# Patient Record
Sex: FEMALE | Race: WHITE | NOT HISPANIC OR LATINO | Employment: UNEMPLOYED | URBAN - METROPOLITAN AREA
[De-identification: names, ages, dates, MRNs, and addresses within clinical notes are randomized per-mention and may not be internally consistent; named-entity substitution may affect disease eponyms.]

---

## 2016-09-01 LAB — EXTERNAL THINPREP: NORMAL

## 2017-03-09 ENCOUNTER — CLINICAL SUPPORT (OUTPATIENT)
Dept: OBSTETRICS AND GYNECOLOGY | Facility: CLINIC | Age: 22
End: 2017-03-09

## 2017-03-09 VITALS — HEIGHT: 66 IN

## 2017-03-09 DIAGNOSIS — Z30.013 ENCOUNTER FOR INITIAL PRESCRIPTION OF INJECTABLE CONTRACEPTIVE: Primary | ICD-10-CM

## 2017-03-09 PROCEDURE — 96372 THER/PROPH/DIAG INJ SC/IM: CPT | Performed by: OBSTETRICS & GYNECOLOGY

## 2017-03-09 RX ORDER — MEDROXYPROGESTERONE ACETATE 150 MG/ML
150 INJECTION, SUSPENSION INTRAMUSCULAR ONCE
Status: COMPLETED | OUTPATIENT
Start: 2017-03-09 | End: 2017-03-09

## 2017-03-09 RX ADMIN — MEDROXYPROGESTERONE ACETATE 150 MG: 150 INJECTION, SUSPENSION INTRAMUSCULAR at 14:01

## 2017-03-09 NOTE — PROGRESS NOTES
Patient presented today for depo provera injection.    She tolerated the procedure well.  It was given in left deltoid.  Also explained new procedure and will call in rx and patient to  and bring to office/mbc

## 2017-07-27 ENCOUNTER — INITIAL PRENATAL (OUTPATIENT)
Dept: OBSTETRICS AND GYNECOLOGY | Facility: CLINIC | Age: 22
End: 2017-07-27

## 2017-07-27 ENCOUNTER — TELEPHONE (OUTPATIENT)
Dept: OBSTETRICS AND GYNECOLOGY | Facility: CLINIC | Age: 22
End: 2017-07-27

## 2017-07-27 VITALS — DIASTOLIC BLOOD PRESSURE: 74 MMHG | SYSTOLIC BLOOD PRESSURE: 113 MMHG | WEIGHT: 133 LBS | BODY MASS INDEX: 21.47 KG/M2

## 2017-07-27 DIAGNOSIS — O21.9 NAUSEA/VOMITING IN PREGNANCY: ICD-10-CM

## 2017-07-27 DIAGNOSIS — Z02.83 ENCOUNTER FOR DRUG SCREENING: ICD-10-CM

## 2017-07-27 DIAGNOSIS — Z11.3 SCREEN FOR STD (SEXUALLY TRANSMITTED DISEASE): ICD-10-CM

## 2017-07-27 DIAGNOSIS — Z11.4 SCREENING FOR HIV (HUMAN IMMUNODEFICIENCY VIRUS): ICD-10-CM

## 2017-07-27 DIAGNOSIS — N92.6 MISSED MENSES: Primary | ICD-10-CM

## 2017-07-27 DIAGNOSIS — Z34.82 NORMAL PREGNANCY IN MULTIGRAVIDA IN SECOND TRIMESTER: ICD-10-CM

## 2017-07-27 LAB
B-HCG UR QL: POSITIVE
EXTERNAL GC/CHLAMYDIA: NORMAL
EXTERNAL THINPREP: NORMAL
EXTERNAL URINE CULTURE: NORMAL
INTERNAL NEGATIVE CONTROL: NEGATIVE
INTERNAL POSITIVE CONTROL: POSITIVE
Lab: ABNORMAL

## 2017-07-27 PROCEDURE — 99214 OFFICE O/P EST MOD 30 MIN: CPT | Performed by: OBSTETRICS & GYNECOLOGY

## 2017-07-27 PROCEDURE — 81025 URINE PREGNANCY TEST: CPT | Performed by: OBSTETRICS & GYNECOLOGY

## 2017-07-27 RX ORDER — PROMETHAZINE HYDROCHLORIDE 25 MG/1
25 TABLET ORAL EVERY 6 HOURS PRN
Qty: 30 TABLET | Refills: 1 | Status: SHIPPED | OUTPATIENT
Start: 2017-07-27 | End: 2017-11-09

## 2017-07-27 RX ORDER — PNV NO.95/FERROUS FUM/FOLIC AC 28MG-0.8MG
1 TABLET ORAL DAILY
Qty: 30 TABLET | Refills: 11 | Status: SHIPPED | OUTPATIENT
Start: 2017-07-27 | End: 2017-12-07 | Stop reason: SDUPTHER

## 2017-07-27 NOTE — PROGRESS NOTES
Chief complaint: Pregnancy, nausea  History present illness: Patient is here for her initial prenatal visit.  Her last menses was around 2017; however, the patient had been using Depo-Provera.  She had missed her Depo-Provera injection around that time.  She reports that she has not had a period since then.  She feels that she has noticed fetal movement.  She also reports nausea.  She has had problems with nausea and each of her previous 2 pregnancies, but this responded well to promethazine.  She requests prescription for promethazine today.  She denies vaginal bleeding or leakage of fluid.  No pelvic pain.    OB History    Para Term  AB SAB TAB Ectopic Multiple Living   3 2 2       2      # Outcome Date GA Lbr Mohinder/2nd Weight Sex Delivery Anes PTL Lv   3 Current            2 Term      Vag-Spont      1 Term      Vag-Spont           Past Medical History:   Diagnosis Date   • Abnormal Pap smear of cervix    • Urogenital trichomoniasis      History reviewed. No pertinent surgical history.     Family History   Problem Relation Age of Onset   • Alcohol abuse Mother      Social History   Substance Use Topics   • Smoking status: Former Smoker     Packs/day: 0.30     Types: Cigarettes   • Smokeless tobacco: Never Used   • Alcohol use Yes      Comment: occasiional     Meds:  None    No Known Allergies     ROS:  General: No fever or chills  Constitutional: No weight loss or gain, no hair loss  HENT: No headache, no hearing loss, no tinnitus  Eyes: normal vision, no eye pain  Lungs: No cough, no shortness of breath  Heart: No chest pain, no palpitations  Abdomen: Pos nausea, vomiting, No constipation or diarrhea  : No dysuria, no hematuria  Skin: No rashes  Lymph: No swelling  Neuro: No parathesia, no weakness  Psych: Normal though content, no hallucinations, no SI/HI    PE:   Vitals:    17 1105   BP: 113/74   Weight: 133 lb (60.3 kg)   See prenatal physical in flowsheet    Limited  transabdominal ultrasound today to confirm viability: There is a single live intrauterine pregnancy, measuring about 7 weeks and 1 day crown-rump length.  There is visible cardiac activity noted, but unable to obtain the rate by Doppler.    Assessment:  1.  21-year-old  3 para 2 at 7 and one sevenths weeks gestational age by today's ultrasound  2.  History of recent marijuana usage, none since she learned that she was pregnant  3.  Occasional tobacco usage  4.  Nausea and vomiting of pregnancy    Plan:  1.  We discussed revised due date with the patient.  Initial prenatal counseling performed.  Start a daily prenatal vitamin.  Pap smear is up-to-date from last year.  Gonorrhea and chlamydia test today.  Routine prenatal labs today.  We will have the patient perform a formal ultrasound for dating.  Return to the office in 4 weeks for OB follow-up.  2.  We discussed the usage of marijuana in pregnancy.  The risks of marijuana smoking were discussed with the patient.  Total cessation is advised.  Patient does not really smoke, but she reports that she may take up off of the cigarette every now and then.  She is advised to completely avoid tobacco usage as well as secondhand smoke.  She verbalized understanding.  3.  Dietary and lifestyle modifications to help with nausea and vomiting were discussed.  We'll start promethazine.  Patient advised to minimize usage of any medications unless absolute necessary.  I spent 15 out of 25 minutes with the patient in face to face counseling of the above issues.

## 2017-07-27 NOTE — TELEPHONE ENCOUNTER
----- Message from Marleni Culver sent at 7/27/2017 12:30 PM EDT -----  Contact: ob pt  Pt called stating she was seen today in office and asked if she could have a RX for nausea and vomiting. Pt went to the pharmacy and only her prenatals have been sent over. Please advise.    Pt # is [phone]  Pharmacy is in chart

## 2017-07-27 NOTE — TELEPHONE ENCOUNTER
It does not look like this patient made any future appointments.  Please call the patient to schedule her OB follow-up appointment in 4 weeks and an ultrasound appointment for dating next available.

## 2017-07-28 LAB — HCV AB S/CO SERPL IA: 0.1 S/CO RATIO (ref 0–0.9)

## 2017-07-29 LAB
ABO GROUP BLD: (no result)
BACTERIA UR CULT: ABNORMAL
BACTERIA UR CULT: ABNORMAL
BASOPHILS # BLD AUTO: 0 X10E3/UL (ref 0–0.2)
BASOPHILS NFR BLD AUTO: 0 %
BLD GP AB SCN SERPL QL: NEGATIVE
EOSINOPHIL # BLD AUTO: 0 X10E3/UL (ref 0–0.4)
EOSINOPHIL NFR BLD AUTO: 0 %
ERYTHROCYTE [DISTWIDTH] IN BLOOD BY AUTOMATED COUNT: 14 % (ref 12.3–15.4)
HBV SURFACE AG SERPL QL IA: NEGATIVE
HCT VFR BLD AUTO: 37 % (ref 34–46.6)
HGB BLD-MCNC: 12.2 G/DL (ref 11.1–15.9)
HIV 1+2 AB+HIV1 P24 AG SERPL QL IA: NON REACTIVE
IMM GRANULOCYTES # BLD: 0 X10E3/UL (ref 0–0.1)
IMM GRANULOCYTES NFR BLD: 0 %
LYMPHOCYTES # BLD AUTO: 1.4 X10E3/UL (ref 0.7–3.1)
LYMPHOCYTES NFR BLD AUTO: 19 %
MCH RBC QN AUTO: 30.2 PG (ref 26.6–33)
MCHC RBC AUTO-ENTMCNC: 33 G/DL (ref 31.5–35.7)
MCV RBC AUTO: 92 FL (ref 79–97)
MONOCYTES # BLD AUTO: 0.4 X10E3/UL (ref 0.1–0.9)
MONOCYTES NFR BLD AUTO: 5 %
NEUTROPHILS # BLD AUTO: 5.7 X10E3/UL (ref 1.4–7)
NEUTROPHILS NFR BLD AUTO: 76 %
PLATELET # BLD AUTO: 245 X10E3/UL (ref 150–379)
RBC # BLD AUTO: 4.04 X10E6/UL (ref 3.77–5.28)
RH BLD: POSITIVE
RPR SER QL: NON REACTIVE
RUBV IGG SERPL IA-ACNC: 1.15 INDEX
WBC # BLD AUTO: 7.6 X10E3/UL (ref 3.4–10.8)

## 2017-07-30 LAB
C TRACH RRNA SPEC QL NAA+PROBE: NEGATIVE
N GONORRHOEA RRNA SPEC QL NAA+PROBE: NEGATIVE
T VAGINALIS RRNA SPEC QL NAA+PROBE: NEGATIVE

## 2017-07-31 ENCOUNTER — PROCEDURE VISIT (OUTPATIENT)
Dept: OBSTETRICS AND GYNECOLOGY | Facility: CLINIC | Age: 22
End: 2017-07-31

## 2017-07-31 DIAGNOSIS — Z36.89 ENCOUNTER TO ESTABLISH GESTATIONAL AGE USING ULTRASOUND: Primary | ICD-10-CM

## 2017-07-31 PROBLEM — O99.820 GBS (GROUP B STREPTOCOCCUS CARRIER), +RV CULTURE, CURRENTLY PREGNANT: Status: ACTIVE | Noted: 2017-07-31

## 2017-07-31 PROCEDURE — 76817 TRANSVAGINAL US OBSTETRIC: CPT | Performed by: OBSTETRICS & GYNECOLOGY

## 2017-08-01 LAB
11OH-THC SPEC-MCNC: 2.2 NG/ML
AMPHETAMINES SERPL QL SCN: NEGATIVE NG/ML
BARBITURATES SERPL QL SCN: NEGATIVE UG/ML
BENZODIAZ SERPL QL SCN: NEGATIVE NG/ML
CANNABIDIOL SERPLBLD CFM-MCNC: NEGATIVE NG/ML
CANNABINOIDS SERPL QL SCN: ABNORMAL NG/ML
CANNABINOIDS SPEC QL CFM: POSITIVE
CANNABINOL: NEGATIVE NG/ML
CARBOXYTHC SPEC-MCNC: 125.7 NG/ML
COCAINE+BZE SERPL QL SCN: NEGATIVE NG/ML
METHADONE SERPL QL SCN: NEGATIVE NG/ML
OPIATES SERPL QL SCN: NEGATIVE NG/ML
OXYCODONE+OXYMORPHONE SERPLBLD QL SCN: NEGATIVE NG/ML
PCP SERPL QL SCN: NEGATIVE NG/ML
PROPOXYPH SERPL QL SCN: NEGATIVE NG/ML
THC SERPLBLD CFM-MCNC: 4.3 NG/ML

## 2017-08-24 ENCOUNTER — ROUTINE PRENATAL (OUTPATIENT)
Dept: OBSTETRICS AND GYNECOLOGY | Facility: CLINIC | Age: 22
End: 2017-08-24

## 2017-08-24 VITALS — WEIGHT: 133 LBS | BODY MASS INDEX: 21.47 KG/M2 | DIASTOLIC BLOOD PRESSURE: 77 MMHG | SYSTOLIC BLOOD PRESSURE: 118 MMHG

## 2017-08-24 DIAGNOSIS — Z34.80 NORMAL PREGNANCY IN MULTIGRAVIDA: Primary | ICD-10-CM

## 2017-08-24 PROBLEM — Z02.83 ENCOUNTER FOR DRUG SCREENING: Status: RESOLVED | Noted: 2017-07-27 | Resolved: 2017-08-24

## 2017-08-24 PROBLEM — Z11.4 SCREENING FOR HIV (HUMAN IMMUNODEFICIENCY VIRUS): Status: RESOLVED | Noted: 2017-07-27 | Resolved: 2017-08-24

## 2017-08-24 PROBLEM — Z11.3 SCREEN FOR STD (SEXUALLY TRANSMITTED DISEASE): Status: RESOLVED | Noted: 2017-07-27 | Resolved: 2017-08-24

## 2017-08-24 PROCEDURE — 99213 OFFICE O/P EST LOW 20 MIN: CPT | Performed by: OBSTETRICS & GYNECOLOGY

## 2017-08-24 NOTE — PROGRESS NOTES
CC: Pregnancy  History present illness: Patient is here for routine prenatal visit.  She still has some mild nausea, but this is improved with the Phenergan.  Otherwise she is doing well.  She denies vaginal bleeding or leakage of fluid.   Objective: See vital signs in flow sheet  Gen.: No acute distress, awake and oriented ×3  Abdomen: Soft, nontender, fetal heart tones 170  Extremities: No lower extremity edema, no Tenderness  Labs: Prenatal labs reviewed and flow sheet updated  Ultrasound: Reviewed, revised due date discussed  Assessment:  1.  21-year-old  3 para 2 at 10-5/7 weeks gestational age  2.  GBS bacteriuria  Plan:  1.  Patient's ultrasound findings were discussed.  Revised due date was discussed.  Lab results discussed.  Significance of GBS bacteria discussed.  Should a low level of bacteria, and thus does not need treatment at this time.  2.  Patient asked about blood testing to check for gender.  We discussed cell free DNA testing for aneuploidy.  Indications, risks, benefits, and limitations were discussed.  The patient verbalizes understanding.  Patient is currently low risk for aneuploidy, and as per ACOG recommendations would not recommend cell free DNA currently.  Patient will consider quad screen at the appropriate gestational age.  3.  Return to the office in 4 weeks for OB follow-up.  4.  Patient states that she does not want to have anymore children.  We discussed the risks of sterilization at such a young age including the risks of regret.  May be more appropriate to consider long-acting reversible contraception.  Educational handouts were given to the patient.  I spent 12 out of 15 minutes with the patient in face to face counseling of the above issues.

## 2017-09-25 ENCOUNTER — TELEPHONE (OUTPATIENT)
Dept: OBSTETRICS AND GYNECOLOGY | Facility: CLINIC | Age: 22
End: 2017-09-25

## 2017-10-12 ENCOUNTER — ROUTINE PRENATAL (OUTPATIENT)
Dept: OBSTETRICS AND GYNECOLOGY | Facility: CLINIC | Age: 22
End: 2017-10-12

## 2017-10-12 VITALS — BODY MASS INDEX: 21.47 KG/M2 | DIASTOLIC BLOOD PRESSURE: 65 MMHG | WEIGHT: 133 LBS | SYSTOLIC BLOOD PRESSURE: 109 MMHG

## 2017-10-12 DIAGNOSIS — Z34.80 NORMAL PREGNANCY IN MULTIGRAVIDA: Primary | ICD-10-CM

## 2017-10-12 LAB — EXTERNAL GENETIC TESTING, MATERNAL BLOOD: NORMAL

## 2017-10-12 PROCEDURE — 99213 OFFICE O/P EST LOW 20 MIN: CPT | Performed by: OBSTETRICS & GYNECOLOGY

## 2017-10-12 NOTE — PROGRESS NOTES
Chief complaint: Pregnancy  History present illness: Patient here for routine prenatal visit.  She has no complaints today.  She reports that her nausea and vomiting has resolved.  She is no longer taking Phenergan.  She has started to notice some fetal movement recently.  No vaginal bleeding or leakage of fluid.  Objective: See vital signs in flowsheet  Gen.: No acute distress, awake and oriented ×3  Abdomen: Soft, nontender, fetal heart tones 150, fundal height 18 cm  Extremities: No lower extremity edema, tenderness  Assessment:  1.  22-year-old  3 para 2 at 17-5/7 weeks gestational age  Plan:  1.  We discussed screening options for aneuploidy and open neural tube defect.  The risks, benefits, alternatives were discussed.  Patient declines serum screening.  Plan ultrasound in 1 week for anatomy.  2.  We discussed the patient's due date.  Explained revision her due date based on early first trimester ultrasound.  She verbalized understanding.  Ultrasound 1 week.  Return to the office in 4 weeks for OB follow-up.

## 2017-10-17 ENCOUNTER — PROCEDURE VISIT (OUTPATIENT)
Dept: OBSTETRICS AND GYNECOLOGY | Facility: CLINIC | Age: 22
End: 2017-10-17

## 2017-10-17 DIAGNOSIS — Z36.3 ANTENATAL SCREENING FOR MALFORMATION USING ULTRASONICS: Primary | ICD-10-CM

## 2017-10-17 PROCEDURE — 76805 OB US >/= 14 WKS SNGL FETUS: CPT | Performed by: OBSTETRICS & GYNECOLOGY

## 2017-11-03 ENCOUNTER — PROCEDURE VISIT (OUTPATIENT)
Dept: OBSTETRICS AND GYNECOLOGY | Facility: CLINIC | Age: 22
End: 2017-11-03

## 2017-11-03 DIAGNOSIS — IMO0002 EVALUATE ANATOMY NOT SEEN ON PRIOR SONOGRAM: Primary | ICD-10-CM

## 2017-11-03 PROCEDURE — 76816 OB US FOLLOW-UP PER FETUS: CPT | Performed by: OBSTETRICS & GYNECOLOGY

## 2017-11-09 ENCOUNTER — ROUTINE PRENATAL (OUTPATIENT)
Dept: OBSTETRICS AND GYNECOLOGY | Facility: CLINIC | Age: 22
End: 2017-11-09

## 2017-11-09 VITALS — BODY MASS INDEX: 22.27 KG/M2 | DIASTOLIC BLOOD PRESSURE: 64 MMHG | WEIGHT: 138 LBS | SYSTOLIC BLOOD PRESSURE: 117 MMHG

## 2017-11-09 DIAGNOSIS — Z34.80 NORMAL PREGNANCY IN MULTIGRAVIDA: Primary | ICD-10-CM

## 2017-11-09 DIAGNOSIS — O43.192 MARGINAL INSERTION OF UMBILICAL CORD AFFECTING MANAGEMENT OF MOTHER IN SECOND TRIMESTER: ICD-10-CM

## 2017-11-09 DIAGNOSIS — Z13.1 SCREENING FOR DIABETES MELLITUS: ICD-10-CM

## 2017-11-09 PROCEDURE — 99213 OFFICE O/P EST LOW 20 MIN: CPT | Performed by: OBSTETRICS & GYNECOLOGY

## 2017-11-09 NOTE — PROGRESS NOTES
Chief complaint: Pregnancy  History present illness: Patient is here for her routine prenatal visit.  She has no major complaints today.  She reports good fetal movement.  No vaginal bleeding or leakage of fluid.  No contractions.  Objective: See vital signs in flowsheet  Gen.: No acute distress, awake and oriented ×3  Abdomen: Soft, nontender, fundal height 20 cm, fetal heart tones 135  Extremities: No edema, no tenderness  Ultrasound: Ultrasound on 11/3/17 which was performed for interval evaluation of structures not previously seen.  Cardiac anatomy was normal.  There was what appeared to be a marginal insertion of the umbilical cord and the placenta.  Otherwise appropriate interval growth.  Otherwise normal anatomy.  3 vessel cord was noted.  Assessment:  1.  22-year-old  3 para 2 at 21-5/7 weeks gestational age  2.  Marginal insertion of the umbilical cord  Plan:  1.  Ultrasound findings were discussed with the patient.  Limitations of ultrasound were discussed.  The significance of the finding of a marginal cord insertion was discussed.  We discussed the potential for fetal growth restriction, subtle abruption,  delivery, hypertensive disorders of pregnancy, and the potential for stillbirth.  I explained that these more applied to the situation of a velamentous cord insertion, which did not appear to be the case here.  I will refer the patient to maternal fetal medicine for evaluation and recommendations of these findings.  I would suspect she will need to have serial growth scans about every 4 weeks starting at 28 weeks.  The patient verbalizes understanding.  2.  I will plan to see the patient back in 4 weeks for routine follow-up.  She'll need her glucose screen at the next visit.  I spent 12 out of 15 minutes with the patient in face to face counseling of the above issues.

## 2017-12-07 ENCOUNTER — ROUTINE PRENATAL (OUTPATIENT)
Dept: OBSTETRICS AND GYNECOLOGY | Facility: CLINIC | Age: 22
End: 2017-12-07

## 2017-12-07 ENCOUNTER — RESULTS ENCOUNTER (OUTPATIENT)
Dept: OBSTETRICS AND GYNECOLOGY | Facility: CLINIC | Age: 22
End: 2017-12-07

## 2017-12-07 VITALS — DIASTOLIC BLOOD PRESSURE: 58 MMHG | SYSTOLIC BLOOD PRESSURE: 101 MMHG | WEIGHT: 144.6 LBS | BODY MASS INDEX: 23.34 KG/M2

## 2017-12-07 DIAGNOSIS — O43.192 MARGINAL INSERTION OF UMBILICAL CORD AFFECTING MANAGEMENT OF MOTHER IN SECOND TRIMESTER: ICD-10-CM

## 2017-12-07 DIAGNOSIS — Z34.82 NORMAL PREGNANCY IN MULTIGRAVIDA IN SECOND TRIMESTER: ICD-10-CM

## 2017-12-07 DIAGNOSIS — Z34.80 NORMAL PREGNANCY IN MULTIGRAVIDA: Primary | ICD-10-CM

## 2017-12-07 DIAGNOSIS — Z34.80 NORMAL PREGNANCY IN MULTIGRAVIDA: ICD-10-CM

## 2017-12-07 DIAGNOSIS — Z13.1 SCREENING FOR DIABETES MELLITUS: ICD-10-CM

## 2017-12-07 PROCEDURE — 99213 OFFICE O/P EST LOW 20 MIN: CPT | Performed by: OBSTETRICS & GYNECOLOGY

## 2017-12-07 RX ORDER — PNV NO.95/FERROUS FUM/FOLIC AC 28MG-0.8MG
1 TABLET ORAL DAILY
Qty: 30 TABLET | Refills: 11 | Status: SHIPPED | OUTPATIENT
Start: 2017-12-07 | End: 2017-12-28 | Stop reason: SDUPTHER

## 2017-12-07 NOTE — PROGRESS NOTES
Chief complaint pregnancy, needs refills of prenatal vitamins  History of present illness: Patient is here for her routine prenatal visit.  She has no major complaints today.  She'll like refills of her prenatal vitamins.  She reports good fetal movement.  No contractions or vaginal bleeding.  The patient saw maternal-fetal medicine last week.  They recommended serial growth ultrasound and  testing beginning at 32 weeks.  We can do that in this office.  Objective: See vital signs in flowsheet  Gen.: No acute distress, awake and oriented ×3  Abdomen: Soft, nontender, fetal heart tones 155  Extremities: No edema, no tenderness  Assessment:  1.  22-year-old  3 para 2 at 25-5/7 weeks gestational age  2.  Marginal insertion of the umbilical cord  Plan:  1.  Glucose screen today.  2.  I've reviewed general fetal medicine recommendations from her last visit.  They recommended serial growth ultrasound in the third trimester and  testing unit at 32 weeks.  We will plan ultrasound in 3 weeks for growth.  Return to the office in 3 weeks to see me.  Refill of prenatal vitamins.  All questions answered.  I spent 12 out of 15 minutes with the patient in face to face counseling of the above issues.

## 2017-12-08 LAB — GLUCOSE 1H P 50 G GLC PO SERPL-MCNC: 76 MG/DL (ref 65–139)

## 2017-12-28 ENCOUNTER — ROUTINE PRENATAL (OUTPATIENT)
Dept: OBSTETRICS AND GYNECOLOGY | Facility: CLINIC | Age: 22
End: 2017-12-28

## 2017-12-28 ENCOUNTER — PROCEDURE VISIT (OUTPATIENT)
Dept: OBSTETRICS AND GYNECOLOGY | Facility: CLINIC | Age: 22
End: 2017-12-28

## 2017-12-28 VITALS — SYSTOLIC BLOOD PRESSURE: 136 MMHG | WEIGHT: 142 LBS | DIASTOLIC BLOOD PRESSURE: 82 MMHG | BODY MASS INDEX: 22.92 KG/M2

## 2017-12-28 DIAGNOSIS — Z34.80 SUPERVISION OF OTHER NORMAL PREGNANCY, ANTEPARTUM: Primary | ICD-10-CM

## 2017-12-28 DIAGNOSIS — Z36.89 ENCOUNTER FOR ULTRASOUND TO CHECK FETAL GROWTH: Primary | ICD-10-CM

## 2017-12-28 DIAGNOSIS — O43.199 MARGINAL INSERTION OF UMBILICAL CORD AFFECTING MANAGEMENT OF MOTHER: ICD-10-CM

## 2017-12-28 DIAGNOSIS — Z34.82 NORMAL PREGNANCY IN MULTIGRAVIDA IN SECOND TRIMESTER: ICD-10-CM

## 2017-12-28 PROCEDURE — 90471 IMMUNIZATION ADMIN: CPT | Performed by: OBSTETRICS & GYNECOLOGY

## 2017-12-28 PROCEDURE — 99213 OFFICE O/P EST LOW 20 MIN: CPT | Performed by: OBSTETRICS & GYNECOLOGY

## 2017-12-28 PROCEDURE — 76816 OB US FOLLOW-UP PER FETUS: CPT | Performed by: OBSTETRICS & GYNECOLOGY

## 2017-12-28 PROCEDURE — 90656 IIV3 VACC NO PRSV 0.5 ML IM: CPT | Performed by: OBSTETRICS & GYNECOLOGY

## 2017-12-28 RX ORDER — PNV NO.95/FERROUS FUM/FOLIC AC 28MG-0.8MG
1 TABLET ORAL DAILY
Qty: 30 TABLET | Refills: 11 | Status: SHIPPED | OUTPATIENT
Start: 2017-12-28 | End: 2018-05-11

## 2017-12-28 NOTE — PROGRESS NOTES
Chief Complaint   Patient presents with   • Routine Prenatal Visit      Margaret Olmstead is a 22 y.o.  at 28w5d   Reports some round ligament pain and lower back pain  Denies vaginal bleeding, LOF, contractions  Notes normal fetal movements  /82  Wt 64.4 kg (142 lb)  LMP 2017 (Exact Date)  BMI 22.92 kg/m2   Abd: gravid, nontender  See flowsheet  ASSESSMENT:   IUP at 28w5d   PLAN:  Growth ultrasound reviewed today - 2lb 13oz (57.1%), BREECH, marginal cord insertion  Flu vaccine today, deferred Tdap until 2 weeks  Discussed contraception. Patient interested in LARC or permanent sterilization.  Will consider options and discuss further at next visit  RTO 2 weeks  I spent at least 10 minutes of 15 minute visit in face-to-face counseling

## 2018-01-11 ENCOUNTER — TELEPHONE (OUTPATIENT)
Dept: OBSTETRICS AND GYNECOLOGY | Facility: CLINIC | Age: 23
End: 2018-01-11

## 2018-01-11 ENCOUNTER — ROUTINE PRENATAL (OUTPATIENT)
Dept: OBSTETRICS AND GYNECOLOGY | Facility: CLINIC | Age: 23
End: 2018-01-11

## 2018-01-11 VITALS — SYSTOLIC BLOOD PRESSURE: 115 MMHG | BODY MASS INDEX: 22.92 KG/M2 | DIASTOLIC BLOOD PRESSURE: 76 MMHG | WEIGHT: 142 LBS

## 2018-01-11 DIAGNOSIS — O43.193 MARGINAL INSERTION OF UMBILICAL CORD AFFECTING MANAGEMENT OF MOTHER IN THIRD TRIMESTER: Primary | ICD-10-CM

## 2018-01-11 DIAGNOSIS — O26.893 HEARTBURN DURING PREGNANCY IN THIRD TRIMESTER: Primary | ICD-10-CM

## 2018-01-11 DIAGNOSIS — R12 HEARTBURN DURING PREGNANCY IN THIRD TRIMESTER: Primary | ICD-10-CM

## 2018-01-11 PROBLEM — Z13.1 SCREENING FOR DIABETES MELLITUS: Status: RESOLVED | Noted: 2017-11-09 | Resolved: 2018-01-11

## 2018-01-11 PROBLEM — Z23 NEED FOR DTAP VACCINATION: Status: ACTIVE | Noted: 2018-01-11

## 2018-01-11 PROCEDURE — 99213 OFFICE O/P EST LOW 20 MIN: CPT | Performed by: OBSTETRICS & GYNECOLOGY

## 2018-01-11 RX ORDER — NICOTINE POLACRILEX 4 MG/1
20 GUM, CHEWING ORAL DAILY
Qty: 30 EACH | Refills: 5 | Status: SHIPPED | OUTPATIENT
Start: 2018-01-11 | End: 2018-03-05 | Stop reason: HOSPADM

## 2018-01-11 NOTE — TELEPHONE ENCOUNTER
----- Message from Marleni Culver sent at 1/11/2018  2:23 PM EST -----  Ob pt called stating she talked to you about getting a RX for heartburn. She called the pharmacy and they are telling her nothing was called in. Please advise.    PT # -[phone] (M)  Pharmacy is in chart

## 2018-01-11 NOTE — PROGRESS NOTES
Chief complaint: Pregnancy  History present illness: Patient is here for her routine visit.  She has no major complaints today.  She reports good fetal movement.  Objective: See vital signs in flowsheet  Gen.: No acute distress, awake and oriented ×3  Abdomen: Soft, nontender, fetal heart tones 145  Extremities: No edema, no tenderness  Assessment:  1.  22-year-old  3 para 2 at 30-5/7 weeks gestational age  2.  Marginal insertion of umbilical cord  3.  Needs Tdap  Plan:  1.  We will repeat ultrasound in 2 weeks for growth secondary to marginal insertion of cord.  She will also need to start doing weekly ultrasounds for biophysical profile at that time.  2.  Patient had her flu shot last visit.  She will have the Tdap today.  3.  We discussed postpartum contraceptive plans.  Patient is interested in Nexplanon.  I spent 12 out of 15 minutes with the patient in face to face counseling of the above issues.

## 2018-01-17 ENCOUNTER — TELEPHONE (OUTPATIENT)
Dept: OBSTETRICS AND GYNECOLOGY | Facility: CLINIC | Age: 23
End: 2018-01-17

## 2018-01-17 NOTE — TELEPHONE ENCOUNTER
"Heena.    This patient called last night with \"severe\" pain.  I instructed her to go to hospital and she never showed.  Can you call her and find out how she is doing today?    Thanks    Kaitlin"

## 2018-01-25 ENCOUNTER — ROUTINE PRENATAL (OUTPATIENT)
Dept: OBSTETRICS AND GYNECOLOGY | Facility: CLINIC | Age: 23
End: 2018-01-25

## 2018-01-25 ENCOUNTER — PROCEDURE VISIT (OUTPATIENT)
Dept: OBSTETRICS AND GYNECOLOGY | Facility: CLINIC | Age: 23
End: 2018-01-25

## 2018-01-25 VITALS — DIASTOLIC BLOOD PRESSURE: 69 MMHG | BODY MASS INDEX: 23.6 KG/M2 | SYSTOLIC BLOOD PRESSURE: 105 MMHG | WEIGHT: 146.2 LBS

## 2018-01-25 DIAGNOSIS — Z34.80 NORMAL PREGNANCY IN MULTIGRAVIDA: Primary | ICD-10-CM

## 2018-01-25 DIAGNOSIS — O26.843 UTERINE SIZE DATE DISCREPANCY PREGNANCY, THIRD TRIMESTER: Primary | ICD-10-CM

## 2018-01-25 DIAGNOSIS — O43.193 MARGINAL INSERTION OF UMBILICAL CORD AFFECTING MANAGEMENT OF MOTHER IN THIRD TRIMESTER: ICD-10-CM

## 2018-01-25 PROCEDURE — 76819 FETAL BIOPHYS PROFIL W/O NST: CPT | Performed by: OBSTETRICS & GYNECOLOGY

## 2018-01-25 PROCEDURE — 76816 OB US FOLLOW-UP PER FETUS: CPT | Performed by: OBSTETRICS & GYNECOLOGY

## 2018-01-25 PROCEDURE — 99213 OFFICE O/P EST LOW 20 MIN: CPT | Performed by: OBSTETRICS & GYNECOLOGY

## 2018-01-25 NOTE — PROGRESS NOTES
Chief complaint: Pregnancy, [place] Cid  History present illness: Patient is here for her routine visit.  She has no major complaints today.  Reports good fetal movement.  She reports having some occasional cramping and Jose Miguel Cid contractions, which had previously resolved with rest, position change, and oral hydration.  The patient like to discuss permanent sterilization.  She reports that she does not want to have anymore children in the future.  She is concerned that she is 22 and has already had 3 children.  Objective: See vital signs in flowsheet  Gen.: No acute distress, awake and oriented ×3  Abdomen: Soft, nontender, fetal heart tones 141  Extremities: No edema, no tenderness  Ultrasound: Estimated fetal weight 4 lbs. 3 oz. or the 35th percentile.  Abdominal circumference 27th percentile.  Amniotic fluid index 13 7 m.  Vertex.  Biophysical profile   Assessment:  1.  22-year-old  3 para 2 at 32-5/7 weeks gestational age  2.  Marginal insertion of umbilical cord  3.  GBS bacteriuria  Plan:  1.  Ultrasound findings discussed with the patient.  Vertex.  Normal growth.  Continue weekly biophysical profile secondary to history of marginal insertion of cord.  2.   labor signs discussed with the patient.  We discussed issues with occasional [place] contractions.  Reassurance offered.  3.  We discussed patient's postpartum contraceptive plans.  Given her young age I would strongly encourage the patient not to consider permanent sterilization.  I feel that she would be much happier with long-acting reversible contraception, such as Mirena IUD, ParaGard IUD, Nexplanon.  The risks, benefits, alternatives were discussed.  Patient seems interested in Mirena.  I spent 12 out of 15 minutes with the patient in face to face counseling of the above issues.

## 2018-02-01 ENCOUNTER — PROCEDURE VISIT (OUTPATIENT)
Dept: OBSTETRICS AND GYNECOLOGY | Facility: CLINIC | Age: 23
End: 2018-02-01

## 2018-02-01 DIAGNOSIS — O43.193 MARGINAL INSERTION OF UMBILICAL CORD AFFECTING MANAGEMENT OF MOTHER IN THIRD TRIMESTER: Primary | ICD-10-CM

## 2018-02-01 PROCEDURE — 76819 FETAL BIOPHYS PROFIL W/O NST: CPT | Performed by: OBSTETRICS & GYNECOLOGY

## 2018-02-07 ENCOUNTER — ROUTINE PRENATAL (OUTPATIENT)
Dept: OBSTETRICS AND GYNECOLOGY | Facility: CLINIC | Age: 23
End: 2018-02-07

## 2018-02-07 ENCOUNTER — PROCEDURE VISIT (OUTPATIENT)
Dept: OBSTETRICS AND GYNECOLOGY | Facility: CLINIC | Age: 23
End: 2018-02-07

## 2018-02-07 VITALS — BODY MASS INDEX: 23.79 KG/M2 | SYSTOLIC BLOOD PRESSURE: 135 MMHG | WEIGHT: 147.4 LBS | DIASTOLIC BLOOD PRESSURE: 83 MMHG

## 2018-02-07 DIAGNOSIS — O43.193 MARGINAL INSERTION OF UMBILICAL CORD AFFECTING MANAGEMENT OF MOTHER IN THIRD TRIMESTER: Primary | ICD-10-CM

## 2018-02-07 DIAGNOSIS — Z3A.34 34 WEEKS GESTATION OF PREGNANCY: ICD-10-CM

## 2018-02-07 PROCEDURE — 99213 OFFICE O/P EST LOW 20 MIN: CPT | Performed by: OBSTETRICS & GYNECOLOGY

## 2018-02-07 PROCEDURE — 76819 FETAL BIOPHYS PROFIL W/O NST: CPT | Performed by: OBSTETRICS & GYNECOLOGY

## 2018-02-07 NOTE — PROGRESS NOTES
CC:  Pregnancy  Pt c/o pressure and Jose Miguel Cid contractions.  Reassurance given.   labor precautions reviewed.  Discussed fetal kick counts.  BPP  today.    A/P:  Supervision of pregnancy at 34 weeks with marginal cord insertion  --Continue weekly BPPs  --F/U in 2 weeks with Dr. Mathias

## 2018-02-15 ENCOUNTER — ROUTINE PRENATAL (OUTPATIENT)
Dept: OBSTETRICS AND GYNECOLOGY | Facility: CLINIC | Age: 23
End: 2018-02-15

## 2018-02-15 ENCOUNTER — PROCEDURE VISIT (OUTPATIENT)
Dept: OBSTETRICS AND GYNECOLOGY | Facility: CLINIC | Age: 23
End: 2018-02-15

## 2018-02-15 VITALS — WEIGHT: 148.6 LBS | DIASTOLIC BLOOD PRESSURE: 75 MMHG | BODY MASS INDEX: 23.98 KG/M2 | SYSTOLIC BLOOD PRESSURE: 129 MMHG

## 2018-02-15 DIAGNOSIS — O43.193 MARGINAL INSERTION OF UMBILICAL CORD AFFECTING MANAGEMENT OF MOTHER IN THIRD TRIMESTER: ICD-10-CM

## 2018-02-15 DIAGNOSIS — Z34.80 NORMAL PREGNANCY IN MULTIGRAVIDA: Primary | ICD-10-CM

## 2018-02-15 DIAGNOSIS — O43.199 MARGINAL INSERTION OF UMBILICAL CORD AFFECTING MANAGEMENT OF MOTHER: Primary | ICD-10-CM

## 2018-02-15 DIAGNOSIS — O99.820 GBS (GROUP B STREPTOCOCCUS CARRIER), +RV CULTURE, CURRENTLY PREGNANT: ICD-10-CM

## 2018-02-15 PROBLEM — R12 HEARTBURN DURING PREGNANCY IN THIRD TRIMESTER: Status: RESOLVED | Noted: 2018-01-11 | Resolved: 2018-02-15

## 2018-02-15 PROBLEM — Z23 NEED FOR DTAP VACCINATION: Status: RESOLVED | Noted: 2018-01-11 | Resolved: 2018-02-15

## 2018-02-15 PROBLEM — O21.9 NAUSEA/VOMITING IN PREGNANCY: Status: RESOLVED | Noted: 2017-07-27 | Resolved: 2018-02-15

## 2018-02-15 PROBLEM — O26.893 HEARTBURN DURING PREGNANCY IN THIRD TRIMESTER: Status: RESOLVED | Noted: 2018-01-11 | Resolved: 2018-02-15

## 2018-02-15 PROCEDURE — 99213 OFFICE O/P EST LOW 20 MIN: CPT | Performed by: OBSTETRICS & GYNECOLOGY

## 2018-02-15 PROCEDURE — 76819 FETAL BIOPHYS PROFIL W/O NST: CPT | Performed by: OBSTETRICS & GYNECOLOGY

## 2018-02-15 NOTE — PROGRESS NOTES
Chief complaint: Pregnancy, back pain  History of present illness: Patient here for routine prenatal visit.  She does note some occasional low back pain.  No vaginal bleeding or leakage of fluid.  Normal fetal movement.  Objective: See vital signs in flowsheet  Gen.: No acute distress, awake and oriented ×3  Abdomen: Soft, nontender, fetal heart tones 150  Extremities: No edema, no tenderness  Ultrasound today: Biophysical profile .  Amniotic fluid index 14 cm.  Assessment:  22-year-old  3 para 2 at 35-5/7 weeks gestational age  2.  Marginal insertion of umbilical cord  3.  GBS in urine  Plan:  1.  Ultrasound findings discussed with the patient today.  Continue weekly biophysical profile secondary to marginal insertion.  2.  Labor signs discussed.  3.  Do not need to repeat GBS today, since patient has positive GBS in urine.  We will treat as positive in labor.  Return to the office in 1 week.

## 2018-02-22 ENCOUNTER — PROCEDURE VISIT (OUTPATIENT)
Dept: OBSTETRICS AND GYNECOLOGY | Facility: CLINIC | Age: 23
End: 2018-02-22

## 2018-02-22 ENCOUNTER — ROUTINE PRENATAL (OUTPATIENT)
Dept: OBSTETRICS AND GYNECOLOGY | Facility: CLINIC | Age: 23
End: 2018-02-22

## 2018-02-22 VITALS — BODY MASS INDEX: 24.21 KG/M2 | SYSTOLIC BLOOD PRESSURE: 115 MMHG | WEIGHT: 150 LBS | DIASTOLIC BLOOD PRESSURE: 70 MMHG

## 2018-02-22 DIAGNOSIS — Z34.80 NORMAL PREGNANCY IN MULTIGRAVIDA: Primary | ICD-10-CM

## 2018-02-22 DIAGNOSIS — O43.193 MARGINAL INSERTION OF UMBILICAL CORD AFFECTING MANAGEMENT OF MOTHER IN THIRD TRIMESTER: ICD-10-CM

## 2018-02-22 DIAGNOSIS — O43.193 MARGINAL INSERTION OF UMBILICAL CORD AFFECTING MANAGEMENT OF MOTHER IN THIRD TRIMESTER: Primary | ICD-10-CM

## 2018-02-22 PROCEDURE — 99213 OFFICE O/P EST LOW 20 MIN: CPT | Performed by: OBSTETRICS & GYNECOLOGY

## 2018-02-22 PROCEDURE — 76816 OB US FOLLOW-UP PER FETUS: CPT | Performed by: OBSTETRICS & GYNECOLOGY

## 2018-02-22 PROCEDURE — 76819 FETAL BIOPHYS PROFIL W/O NST: CPT | Performed by: OBSTETRICS & GYNECOLOGY

## 2018-02-22 NOTE — PROGRESS NOTES
Chief complaint: Pregnancy, Jose Miguel Cid contractions  History present illness: Patient is here for her routine prenatal visit.  She does note some tightening pains in her lower abdomen and some pelvic pressure.  So far these are somewhat rare and mild still.  Otherwise she is doing well.  Normal fetal movement.  No vaginal bleeding or leakage of fluid.  Objective: See vital signs in flowsheet  Gen.: No acute distress, awake and oriented ×3  Abdomen: Soft, nontender  External is: No edema, no tenderness  Ultrasound today: Estimated fetal weight 6 lbs. 11 oz. or the 53rd percentile.  Vertex.  Amniotic fluid index normal.  Biophysical profile   Assessment:  1.  22-year-old  3 para 2 at 36-5/7 weeks gestational age  2.  Marginal insertion of umbilical cord   3.  GBS bacteriuria  Plan:  1.  Ultrasound findings discussed with the patient.  Growth continues to be reassuring.  We will continue weekly biophysical profile secondary to marginal insertion of cord.  We discussed plans with the patient for labor induction at 39+ weeks of gestation secondary to marginal insertion of cord.  She verbalized understanding and agrees with the plan.  2.  Return to the office in 1 week.  3.  Labor signs discussed.  I spent 12 out of 15 minutes with the patient in face to face counseling of the above issues.

## 2018-03-01 ENCOUNTER — PROCEDURE VISIT (OUTPATIENT)
Dept: OBSTETRICS AND GYNECOLOGY | Facility: CLINIC | Age: 23
End: 2018-03-01

## 2018-03-01 ENCOUNTER — ROUTINE PRENATAL (OUTPATIENT)
Dept: OBSTETRICS AND GYNECOLOGY | Facility: CLINIC | Age: 23
End: 2018-03-01

## 2018-03-01 VITALS — BODY MASS INDEX: 23.89 KG/M2 | DIASTOLIC BLOOD PRESSURE: 79 MMHG | WEIGHT: 148 LBS | SYSTOLIC BLOOD PRESSURE: 130 MMHG

## 2018-03-01 DIAGNOSIS — O43.193 MARGINAL INSERTION OF UMBILICAL CORD AFFECTING MANAGEMENT OF MOTHER IN THIRD TRIMESTER: ICD-10-CM

## 2018-03-01 DIAGNOSIS — O43.193 MARGINAL INSERTION OF UMBILICAL CORD AFFECTING MANAGEMENT OF MOTHER IN THIRD TRIMESTER: Primary | ICD-10-CM

## 2018-03-01 DIAGNOSIS — Z34.80 NORMAL PREGNANCY IN MULTIGRAVIDA: Primary | ICD-10-CM

## 2018-03-01 DIAGNOSIS — O99.820 GBS (GROUP B STREPTOCOCCUS CARRIER), +RV CULTURE, CURRENTLY PREGNANT: ICD-10-CM

## 2018-03-01 PROCEDURE — 76819 FETAL BIOPHYS PROFIL W/O NST: CPT | Performed by: OBSTETRICS & GYNECOLOGY

## 2018-03-01 PROCEDURE — 99213 OFFICE O/P EST LOW 20 MIN: CPT | Performed by: OBSTETRICS & GYNECOLOGY

## 2018-03-01 NOTE — PROGRESS NOTES
Chief complaint: Pregnancy  History present illness: Patient is here for her routine prenatal visit.  She does report some contractions at home.  Good fetal movement.  Objective: See vital signs in flowsheet  Gen.: No acute distress, awake and oriented ×3  Abdomen: Soft, nontender, fetal heart tones 153  Cervix: 4 cm, 70% effaced, -1  Extremities: No edema, no tenderness  Ultrasound today: Biophysical profile .  Amniotic fluid index normal.  Assessment:  1.  22-year-old  3 para 2 at 37-5/7 weeks gestational age  2.  Marginal insertion of umbilical cord  3.  GBS positive  Plan:  1.  Labor signs discussed with the patient.  Given her current cervical exam findings, I would suspect that she will progress into spontaneous labor on her own cervical however, in the event that she reaches 39 weeks of gestation, I would favor labor induction at 39+ weeks gestation given the finding of a marginal insertion of the cord.  The risks, benefits, and alternatives were discussed.  Instructions were given.  I will see the patient back in 1 week for routine prenatal visit.  Positive profile in 1 week as well.  I spent 12 out of 15 minutes with the patient in face to face counseling of the above issues.

## 2018-03-03 ENCOUNTER — HOSPITAL ENCOUNTER (INPATIENT)
Facility: HOSPITAL | Age: 23
LOS: 2 days | Discharge: HOME | End: 2018-03-05
Attending: OBSTETRICS & GYNECOLOGY | Admitting: OBSTETRICS & GYNECOLOGY

## 2018-03-03 ENCOUNTER — ANESTHESIA EVENT (OUTPATIENT)
Dept: LABOR AND DELIVERY | Facility: HOSPITAL | Age: 23
End: 2018-03-03

## 2018-03-03 ENCOUNTER — ANESTHESIA (OUTPATIENT)
Dept: LABOR AND DELIVERY | Facility: HOSPITAL | Age: 23
End: 2018-03-03

## 2018-03-03 PROBLEM — Z34.90 PREGNANCY: Status: ACTIVE | Noted: 2018-03-03

## 2018-03-03 LAB
ABO GROUP BLD: NORMAL
AMPHET+METHAMPHET UR QL: NEGATIVE
BARBITURATES UR QL SCN: NEGATIVE
BENZODIAZ UR QL SCN: NEGATIVE
BLD GP AB SCN SERPL QL: NEGATIVE
CANNABINOIDS SERPL QL: POSITIVE
COCAINE UR QL: NEGATIVE
DEPRECATED RDW RBC AUTO: 49.1 FL (ref 37–54)
ERYTHROCYTE [DISTWIDTH] IN BLOOD BY AUTOMATED COUNT: 15.1 % (ref 11.7–13)
HCT VFR BLD AUTO: 29.5 % (ref 35.6–45.5)
HGB BLD-MCNC: 9.9 G/DL (ref 11.9–15.5)
MCH RBC QN AUTO: 29.8 PG (ref 26.9–32)
MCHC RBC AUTO-ENTMCNC: 33.6 G/DL (ref 32.4–36.3)
MCV RBC AUTO: 88.9 FL (ref 80.5–98.2)
METHADONE UR QL SCN: NEGATIVE
OPIATES UR QL: NEGATIVE
OXYCODONE UR QL SCN: NEGATIVE
PLATELET # BLD AUTO: 144 10*3/MM3 (ref 140–500)
PMV BLD AUTO: 11.4 FL (ref 6–12)
RBC # BLD AUTO: 3.32 10*6/MM3 (ref 3.9–5.2)
RH BLD: POSITIVE
WBC NRBC COR # BLD: 9.15 10*3/MM3 (ref 4.5–10.7)

## 2018-03-03 PROCEDURE — 3E03329 INTRODUCTION OF OTHER ANTI-INFECTIVE INTO PERIPHERAL VEIN, PERCUTANEOUS APPROACH: ICD-10-PCS | Performed by: OBSTETRICS & GYNECOLOGY

## 2018-03-03 PROCEDURE — 25010000002 ROPIVACAINE PER 1 MG: Performed by: ANESTHESIOLOGY

## 2018-03-03 PROCEDURE — G0480 DRUG TEST DEF 1-7 CLASSES: HCPCS | Performed by: OBSTETRICS & GYNECOLOGY

## 2018-03-03 PROCEDURE — 80307 DRUG TEST PRSMV CHEM ANLYZR: CPT | Performed by: OBSTETRICS & GYNECOLOGY

## 2018-03-03 PROCEDURE — 85027 COMPLETE CBC AUTOMATED: CPT | Performed by: OBSTETRICS & GYNECOLOGY

## 2018-03-03 PROCEDURE — 86850 RBC ANTIBODY SCREEN: CPT | Performed by: OBSTETRICS & GYNECOLOGY

## 2018-03-03 PROCEDURE — 86900 BLOOD TYPING SEROLOGIC ABO: CPT | Performed by: OBSTETRICS & GYNECOLOGY

## 2018-03-03 PROCEDURE — 10907ZC DRAINAGE OF AMNIOTIC FLUID, THERAPEUTIC FROM PRODUCTS OF CONCEPTION, VIA NATURAL OR ARTIFICIAL OPENING: ICD-10-PCS | Performed by: OBSTETRICS & GYNECOLOGY

## 2018-03-03 PROCEDURE — 0HQ9XZZ REPAIR PERINEUM SKIN, EXTERNAL APPROACH: ICD-10-PCS | Performed by: OBSTETRICS & GYNECOLOGY

## 2018-03-03 PROCEDURE — 25010000002 PENICILLIN G POTASSIUM PER 600000 UNITS: Performed by: OBSTETRICS & GYNECOLOGY

## 2018-03-03 PROCEDURE — 86901 BLOOD TYPING SEROLOGIC RH(D): CPT | Performed by: OBSTETRICS & GYNECOLOGY

## 2018-03-03 PROCEDURE — C1755 CATHETER, INTRASPINAL: HCPCS | Performed by: ANESTHESIOLOGY

## 2018-03-03 PROCEDURE — 59410 OBSTETRICAL CARE: CPT | Performed by: OBSTETRICS & GYNECOLOGY

## 2018-03-03 PROCEDURE — 88307 TISSUE EXAM BY PATHOLOGIST: CPT

## 2018-03-03 PROCEDURE — S0260 H&P FOR SURGERY: HCPCS | Performed by: OBSTETRICS & GYNECOLOGY

## 2018-03-03 RX ORDER — SODIUM CHLORIDE 0.9 % (FLUSH) 0.9 %
1-10 SYRINGE (ML) INJECTION AS NEEDED
Status: DISCONTINUED | OUTPATIENT
Start: 2018-03-03 | End: 2018-03-05 | Stop reason: HOSPADM

## 2018-03-03 RX ORDER — TERBUTALINE SULFATE 1 MG/ML
0.25 INJECTION, SOLUTION SUBCUTANEOUS AS NEEDED
Status: DISCONTINUED | OUTPATIENT
Start: 2018-03-03 | End: 2018-03-03 | Stop reason: HOSPADM

## 2018-03-03 RX ORDER — ROPIVACAINE HYDROCHLORIDE 2 MG/ML
INJECTION, SOLUTION EPIDURAL; INFILTRATION; PERINEURAL AS NEEDED
Status: DISCONTINUED | OUTPATIENT
Start: 2018-03-03 | End: 2018-03-03 | Stop reason: SURG

## 2018-03-03 RX ORDER — MISOPROSTOL 200 UG/1
800 TABLET ORAL AS NEEDED
Status: DISCONTINUED | OUTPATIENT
Start: 2018-03-03 | End: 2018-03-05 | Stop reason: HOSPADM

## 2018-03-03 RX ORDER — PENICILLIN G 3000000 [IU]/50ML
3 INJECTION, SOLUTION INTRAVENOUS EVERY 4 HOURS
Status: DISCONTINUED | OUTPATIENT
Start: 2018-03-03 | End: 2018-03-03 | Stop reason: HOSPADM

## 2018-03-03 RX ORDER — DIPHENHYDRAMINE HYDROCHLORIDE 50 MG/ML
12.5 INJECTION INTRAMUSCULAR; INTRAVENOUS EVERY 8 HOURS PRN
Status: DISCONTINUED | OUTPATIENT
Start: 2018-03-03 | End: 2018-03-03 | Stop reason: HOSPADM

## 2018-03-03 RX ORDER — ZOLPIDEM TARTRATE 5 MG/1
5 TABLET ORAL NIGHTLY PRN
Status: DISCONTINUED | OUTPATIENT
Start: 2018-03-03 | End: 2018-03-05 | Stop reason: HOSPADM

## 2018-03-03 RX ORDER — METHYLERGONOVINE MALEATE 0.2 MG/ML
200 INJECTION INTRAVENOUS ONCE AS NEEDED
Status: DISCONTINUED | OUTPATIENT
Start: 2018-03-03 | End: 2018-03-05 | Stop reason: HOSPADM

## 2018-03-03 RX ORDER — HYDROCODONE BITARTRATE AND ACETAMINOPHEN 5; 325 MG/1; MG/1
1 TABLET ORAL EVERY 4 HOURS PRN
Status: DISCONTINUED | OUTPATIENT
Start: 2018-03-03 | End: 2018-03-05 | Stop reason: HOSPADM

## 2018-03-03 RX ORDER — DOCUSATE SODIUM 100 MG/1
100 CAPSULE, LIQUID FILLED ORAL 2 TIMES DAILY
Status: DISCONTINUED | OUTPATIENT
Start: 2018-03-03 | End: 2018-03-05 | Stop reason: HOSPADM

## 2018-03-03 RX ORDER — OXYTOCIN-SODIUM CHLORIDE 0.9% IV SOLN 30 UNIT/500ML 30-0.9/5 UT/ML-%
125 SOLUTION INTRAVENOUS CONTINUOUS PRN
Status: COMPLETED | OUTPATIENT
Start: 2018-03-03 | End: 2018-03-03

## 2018-03-03 RX ORDER — CARBOPROST TROMETHAMINE 250 UG/ML
250 INJECTION, SOLUTION INTRAMUSCULAR AS NEEDED
Status: DISCONTINUED | OUTPATIENT
Start: 2018-03-03 | End: 2018-03-05 | Stop reason: HOSPADM

## 2018-03-03 RX ORDER — IBUPROFEN 600 MG/1
600 TABLET ORAL EVERY 8 HOURS PRN
Status: DISCONTINUED | OUTPATIENT
Start: 2018-03-03 | End: 2018-03-05 | Stop reason: HOSPADM

## 2018-03-03 RX ORDER — ONDANSETRON 2 MG/ML
4 INJECTION INTRAMUSCULAR; INTRAVENOUS ONCE AS NEEDED
Status: DISCONTINUED | OUTPATIENT
Start: 2018-03-03 | End: 2018-03-03 | Stop reason: HOSPADM

## 2018-03-03 RX ORDER — ONDANSETRON 4 MG/1
4 TABLET, FILM COATED ORAL EVERY 8 HOURS PRN
Status: DISCONTINUED | OUTPATIENT
Start: 2018-03-03 | End: 2018-03-05 | Stop reason: HOSPADM

## 2018-03-03 RX ORDER — FAMOTIDINE 10 MG/ML
20 INJECTION, SOLUTION INTRAVENOUS ONCE AS NEEDED
Status: DISCONTINUED | OUTPATIENT
Start: 2018-03-03 | End: 2018-03-03 | Stop reason: HOSPADM

## 2018-03-03 RX ORDER — ERYTHROMYCIN 5 MG/G
OINTMENT OPHTHALMIC
Status: DISPENSED
Start: 2018-03-03 | End: 2018-03-04

## 2018-03-03 RX ORDER — OXYTOCIN-SODIUM CHLORIDE 0.9% IV SOLN 30 UNIT/500ML 30-0.9/5 UT/ML-%
250 SOLUTION INTRAVENOUS CONTINUOUS
Status: DISPENSED | OUTPATIENT
Start: 2018-03-03 | End: 2018-03-03

## 2018-03-03 RX ORDER — LIDOCAINE HYDROCHLORIDE 10 MG/ML
5 INJECTION, SOLUTION EPIDURAL; INFILTRATION; INTRACAUDAL; PERINEURAL AS NEEDED
Status: DISCONTINUED | OUTPATIENT
Start: 2018-03-03 | End: 2018-03-03 | Stop reason: HOSPADM

## 2018-03-03 RX ORDER — PRENATAL VIT NO.126/IRON/FOLIC 28MG-0.8MG
1 TABLET ORAL DAILY
Status: DISCONTINUED | OUTPATIENT
Start: 2018-03-03 | End: 2018-03-05 | Stop reason: HOSPADM

## 2018-03-03 RX ORDER — OXYTOCIN-SODIUM CHLORIDE 0.9% IV SOLN 30 UNIT/500ML 30-0.9/5 UT/ML-%
999 SOLUTION INTRAVENOUS ONCE
Status: COMPLETED | OUTPATIENT
Start: 2018-03-03 | End: 2018-03-03

## 2018-03-03 RX ORDER — BISACODYL 10 MG
10 SUPPOSITORY, RECTAL RECTAL DAILY PRN
Status: DISCONTINUED | OUTPATIENT
Start: 2018-03-04 | End: 2018-03-05 | Stop reason: HOSPADM

## 2018-03-03 RX ORDER — PHYTONADIONE 1 MG/.5ML
INJECTION, EMULSION INTRAMUSCULAR; INTRAVENOUS; SUBCUTANEOUS
Status: DISPENSED
Start: 2018-03-03 | End: 2018-03-04

## 2018-03-03 RX ORDER — EPHEDRINE SULFATE 50 MG/ML
5 INJECTION, SOLUTION INTRAVENOUS AS NEEDED
Status: DISCONTINUED | OUTPATIENT
Start: 2018-03-03 | End: 2018-03-03 | Stop reason: HOSPADM

## 2018-03-03 RX ORDER — SODIUM CHLORIDE, SODIUM LACTATE, POTASSIUM CHLORIDE, CALCIUM CHLORIDE 600; 310; 30; 20 MG/100ML; MG/100ML; MG/100ML; MG/100ML
125 INJECTION, SOLUTION INTRAVENOUS CONTINUOUS
Status: DISCONTINUED | OUTPATIENT
Start: 2018-03-03 | End: 2018-03-03

## 2018-03-03 RX ORDER — SODIUM CHLORIDE 0.9 % (FLUSH) 0.9 %
1-10 SYRINGE (ML) INJECTION AS NEEDED
Status: DISCONTINUED | OUTPATIENT
Start: 2018-03-03 | End: 2018-03-03 | Stop reason: HOSPADM

## 2018-03-03 RX ADMIN — SODIUM CHLORIDE, POTASSIUM CHLORIDE, SODIUM LACTATE AND CALCIUM CHLORIDE 125 ML/HR: 600; 310; 30; 20 INJECTION, SOLUTION INTRAVENOUS at 09:50

## 2018-03-03 RX ADMIN — Medication 10 ML: at 09:35

## 2018-03-03 RX ADMIN — SODIUM CHLORIDE 5 MILLION UNITS: 900 INJECTION INTRAVENOUS at 09:15

## 2018-03-03 RX ADMIN — PENICILLIN G 3 MILLION UNITS: 3000000 INJECTION, SOLUTION INTRAVENOUS at 12:48

## 2018-03-03 RX ADMIN — SODIUM CHLORIDE, POTASSIUM CHLORIDE, SODIUM LACTATE AND CALCIUM CHLORIDE 1000 ML: 600; 310; 30; 20 INJECTION, SOLUTION INTRAVENOUS at 09:10

## 2018-03-03 RX ADMIN — Medication: at 16:44

## 2018-03-03 RX ADMIN — OXYTOCIN 125 ML/HR: 10 INJECTION, SOLUTION INTRAMUSCULAR; INTRAVENOUS at 15:23

## 2018-03-03 RX ADMIN — ROPIVACAINE HYDROCHLORIDE 4 ML: 2 INJECTION, SOLUTION EPIDURAL; INFILTRATION at 09:30

## 2018-03-03 RX ADMIN — IBUPROFEN 600 MG: 600 TABLET ORAL at 16:44

## 2018-03-03 RX ADMIN — OXYTOCIN 999 ML/HR: 10 INJECTION, SOLUTION INTRAMUSCULAR; INTRAVENOUS at 14:07

## 2018-03-03 RX ADMIN — ROPIVACAINE HYDROCHLORIDE 5 ML: 2 INJECTION, SOLUTION EPIDURAL; INFILTRATION at 09:28

## 2018-03-03 RX ADMIN — ROPIVACAINE HYDROCHLORIDE 4 ML: 2 INJECTION, SOLUTION EPIDURAL; INFILTRATION at 09:32

## 2018-03-03 RX ADMIN — DOCUSATE SODIUM 100 MG: 100 CAPSULE, LIQUID FILLED ORAL at 20:24

## 2018-03-03 RX ADMIN — HYDROCODONE BITARTRATE AND ACETAMINOPHEN 1 TABLET: 5; 325 TABLET ORAL at 20:24

## 2018-03-03 NOTE — ANESTHESIA PROCEDURE NOTES
Labor Epidural    Patient location during procedure: OB  Start Time: 3/3/2018 9:20 AM  Indication:at surgeon's request  Performed By  Anesthesiologist: SCAR CHONG  Preanesthetic Checklist  Completed: patient identified, site marked, surgical consent, pre-op evaluation, timeout performed, IV checked, risks and benefits discussed and monitors and equipment checked  Prep:  Pt Position:sitting  Sterile Tech:cap, gloves and mask  Prep:povidone-iodine 7.5% surgical scrub  Monitoring:blood pressure monitoring, continuous pulse oximetry and EKG  Epidural Block Procedure:  Approach:midline  Guidance:landmark technique  Location:L4-L5  Needle Type:Tuohy  Needle Gauge:17 and 17 G  Loss of Resistance Medium: air  Loss of Resistance: 7cm  Cath Depth at skin:10 cm  Paresthesia: none  Aspiration:negative  Test Dose:negative  Number of Attempts: 1  Post Assessment:  Dressing:occlusive dressing applied and secured with tape  Pt Tolerance:patient tolerated the procedure well with no apparent complications  Complications:no

## 2018-03-03 NOTE — PLAN OF CARE
Problem: Postpartum, Vaginal Delivery (Adult)  Goal: Signs and Symptoms of Listed Potential Problems Will be Absent or Manageable (Postpartum, Vaginal Delivery)  Outcome: Ongoing (interventions implemented as appropriate)   03/03/18 1520   Postpartum, Vaginal Delivery   Problems Assessed (Postpartum Vaginal Delivery) all   Problems Present (Postpartum Vaginal Delivery) none

## 2018-03-03 NOTE — ANESTHESIA PREPROCEDURE EVALUATION
Anesthesia Evaluation     Patient summary reviewed and Nursing notes reviewed   NPO Solid Status: > 8 hours  NPO Liquid Status: > 8 hours           Airway   Mallampati: II  TM distance: >3 FB  Neck ROM: full  no difficulty expected  Dental - normal exam     Pulmonary - negative pulmonary ROS and normal exam   Cardiovascular - negative cardio ROS and normal exam        Neuro/Psych- negative ROS  GI/Hepatic/Renal/Endo - negative ROS     Musculoskeletal (-) negative ROS    Abdominal  - normal exam   Substance History - negative use     OB/GYN    (+) Pregnant,         Other - negative ROS                       Anesthesia Plan    ASA 2     epidural     Anesthetic plan and risks discussed with patient.

## 2018-03-03 NOTE — OP NOTE
Delivery Note    Obstetrician:   Paul Morse MD      Pre-Delivery Diagnosis: Active labor    Post-Delivery Diagnosis: Same    Procedure: Spontaneous vaginal delivery   22-year-old  3 para 2 presented at 38 weeks in active labor.  Prenatal course was complicated by positive group B strep status.  For this reason, penicillin G was given throughout labor.  Also, patient had ultrasound findings of a marginal insertion of the umbilical cord.  I counseled the patient regarding the meaning of these findings.  The patient was admitted and an epidural catheter was placed for pain control.  Amniotomy was performed at 5-6 cm with return of clear fluid.  Fetal heart tones remained category 1.  The patient progressed along an adequate labor curve to complete effacement and dilation as well as +2 station of the presenting part.      She then pushed to spontaneous vaginal delivery of the fetal head from direct occiput anterior presentation.  The mouth and naris were suctioned with a bulb well on the perineum.  There was a tight nuchal cord.  This was double clamped and then divided.  The shoulders were delivered without difficulty, followed by the remainder of the infant.  The cord was then shortened and the infant was placed on the mother's chest for Kangaroo care.  This is a vigorous female .  Apgars of 8 and 9 were assigned.  The perineum was examined.  There was a small first-degree laceration which was repaired with 3-0 Monocryl.  The placenta  spontaneously.  It was intact and had a three-vessel cord.  There was a marginal insertion of the umbilical cord.  The placenta was sent to pathology as a specimen.  The perineum was reinspected.  There were no additional lacerations.  The cervix was also intact.  Estimated blood loss 350 cc          Apgars: 1' 8  /  5' 9     Placenta and Cord:          Mechanism: spontaneous        Description:  complete, 3 vessel cord    Estimated Blood Loss:  350                              Complications:  None           Condition: Stable    3/3/2018  Paul Morse MD

## 2018-03-03 NOTE — PLAN OF CARE
Problem: Patient Care Overview (Adult)  Goal: Adult Individualization and Mutuality  Outcome: Ongoing (interventions implemented as appropriate)      Problem: Labor (Cervical Ripen, Induct, Augment) (Adult,Obstetrics,Pediatric)  Goal: Signs and Symptoms of Listed Potential Problems Will be Absent or Manageable (Labor)  Outcome: Ongoing (interventions implemented as appropriate)   03/03/18 1118   Labor (Cervical Ripen, Induct, Augment)   Problems Assessed (Labor) all   Problems Present (Labor) pain       Problem: Anesthesia/Analgesia, Neuraxial (Obstetrics)  Goal: Signs and Symptoms of Listed Potential Problems Will be Absent or Manageable (Anesthesia/Analgesia, Neuraxial)  Outcome: Ongoing (interventions implemented as appropriate)   03/03/18 1118   Anesthesia/Analgesia, Neuraxial   Problems Assessed (Neuraxial Anesthesia/Analgesia, OB) all   Problems Present (Neuraxial Anesthesia/Analgesia, OB) none      03/03/18 1118   Anesthesia/Analgesia, Neuraxial   Problems Assessed (Neuraxial Anesthesia/Analgesia, OB) all   Problems Present (Neuraxial Anesthesia/Analgesia, OB) none

## 2018-03-03 NOTE — H&P
H&P Note    Patient Identification:  Name: Margaret Olmstead  Age: 22 y.o.  Sex: female  :  1995  MRN: [id number]                       Chief Complaint:  Active labor    History of Present Illness:   22-year-old  3 para 2 presents at 38 weeks in active labor.  Fetal heart tones are reactive.  Her prenatal course has been complicated by positive group B strep status.  Also, there is a marginal insertion of the umbilical cord on ultrasound.  I counseled the patient regarding both of these issues and answered her questions.    Problem List:  [unfilled]  Past Medical History:  Past Medical History:   Diagnosis Date   • Abnormal Pap smear of cervix    • Urogenital trichomoniasis          Past Surgical History:  Past Surgical History:   Procedure Laterality Date   • WISDOM TOOTH EXTRACTION        Home Meds:  Prescriptions Prior to Admission   Medication Sig Dispense Refill Last Dose   • Omeprazole 20 MG tablet delayed-release Take 20 mg by mouth Daily. 30 each 5 2018 at 1500   • Prenatal Vit-Fe Fumarate-FA (PRENATAL VITAMIN) 27-0.8 MG tablet Take 1 tablet by mouth Daily. 30 tablet 11 3/2/2018 at 0900     Current Meds:   [unfilled]  Allergies:  No Known Allergies  Immunizations:  Immunization History   Administered Date(s) Administered   • Flu Vaccine Quad PF >18YRS 2017     Social History:   Social History   Substance Use Topics   • Smoking status: Former Smoker     Packs/day: 0.30     Types: Cigarettes   • Smokeless tobacco: Never Used      Comment: stopped 2017   • Alcohol use No      Family History:  Family History   Problem Relation Age of Onset   • Alcohol abuse Mother         Review of Systems  Pertinent items are noted in HPI.    Objective:  tMax 24 hrs: Temp (24hrs), Av.4 °F (36.9 °C), Min:98.2 °F (36.8 °C), Max:98.5 °F (36.9 °C)    Vitals Ranges:   Temp:  [98.2 °F (36.8 °C)-98.5 °F (36.9 °C)] 98.5 °F (36.9 °C)  Heart Rate:  [] 116  Resp:  [16-18] 16  BP: (112-139)/(62-80)  112/78  Intake and Output Last 3 Shifts:        Exam:     General Appearance:    Alert, cooperative, no distress, appears stated age   Head:    Normocephalic, without obvious abnormality, atraumatic   Back:     Symmetric, no curvature, ROM normal, no CVA tenderness   Lungs:     Clear to auscultation bilaterally, respirations unlabored   Chest Wall:    No tenderness or deformity    Heart:    Regular rate and rhythm, S1 and S2 normal, no murmur, rub   or gallop       Abdomen:     Soft, non-tender, bowel sounds active all four quadrants,     no masses, no organomegaly   Genitalia:    Cx 80/5-6/-1       Extremities:   Extremities normal, atraumatic, no cyanosis or edema   Skin:   Skin color, texture, turgor normal, no rashes or lesions   Lymph nodes:   Cervical, supraclavicular, and axillary nodes normal       Data Review:  CBC:   Results from last 7 days  Lab Units 03/03/18 0912   WBC 10*3/mm3 9.15   RBC 10*6/mm3 3.32*      Lab Results (last 24 hours)     Procedure Component Value Units Date/Time    CBC (No Diff) [[id number]]  (Abnormal) Collected:  03/03/18 0912    Specimen:  Blood Updated:  03/03/18 0927     WBC 9.15 10*3/mm3      RBC 3.32 (L) 10*6/mm3      Hemoglobin 9.9 (L) g/dL      Hematocrit 29.5 (L) %      MCV 88.9 fL      MCH 29.8 pg      MCHC 33.6 g/dL      RDW 15.1 (H) %      RDW-SD 49.1 fl      MPV 11.4 fL      Platelets 144 10*3/mm3         Assessment:  Active Problems:    Pregnancy      Intrauterine pregnancy at 38-0/7 weeks in active labor.  Counseled and questions answered.  Penicillin G has been given throughout labor for positive group B strep status.  The patient is comfortable with her epidural.  Amniotomy was performed with return of clear fluid.  The patient has been counseled regarding marginal insertion of the umbilical cord.    Plan:  Reassuring fetal heart rate status.  Good progress of labor.  Anticipate a vaginal delivery.    Paul Morse MD  3/3/2018

## 2018-03-03 NOTE — PLAN OF CARE
Problem: Patient Care Overview (Adult)  Goal: Plan of Care Review  Outcome: Ongoing (interventions implemented as appropriate)   18 1141   Coping/Psychosocial Response Interventions   Plan Of Care Reviewed With patient;significant other;grandparent   Patient Care Overview   Progress improving   Outcome Evaluation   Outcome Summary/Follow up Plan anticipate vaginal delivery     Goal: Adult Individualization and Mutuality  Outcome: Ongoing (interventions implemented as appropriate)   18 0900 18 1118   Individualization   Patient Specific Preferences --  Epidural, NICHO, Bottlefeeding   Patient Specific Goals --  Comfortable vaginal delivery   Patient Specific Interventions --  Pain management   Mutuality/Individual Preferences   What Anxieties, Fears or Concerns Do You Have About Your Health or Care? None --    What Questions Do You Have About Your Health or Care? None --    What Information Would Help Us Give You More Personalized Care? None --        Problem: Labor (Cervical Ripen, Induct, Augment) (Adult,Obstetrics,Pediatric)  Goal: Signs and Symptoms of Listed Potential Problems Will be Absent or Manageable (Labor)  Outcome: Ongoing (interventions implemented as appropriate)   18 1141   Labor (Cervical Ripen, Induct, Augment)   Problems Assessed (Labor) all   Problems Present (Labor) none       Problem: Anesthesia/Analgesia, Neuraxial (Obstetrics)  Goal: Signs and Symptoms of Listed Potential Problems Will be Absent or Manageable (Anesthesia/Analgesia, Neuraxial)  Outcome: Ongoing (interventions implemented as appropriate)   18 1141   Anesthesia/Analgesia, Neuraxial   Problems Assessed (Neuraxial Anesthesia/Analgesia, OB) all   Problems Present (Neuraxial Anesthesia/Analgesia, OB) none       Problem: Fall Risk  (Adult,Obstetrics,Pediatric)  Goal: Identify Related Risk Factors and Signs and Symptoms  Outcome: Ongoing (interventions implemented as appropriate)   18 1141    Fall Risk    Fall Risk: Related Risk Factors medication side effects;regional anesthesia    Fall Risk: Signs and Symptoms presence of fall risk factors     Goal: Absence of Maternal Fall  Outcome: Ongoing (interventions implemented as appropriate)   18 1141   Fall Risk  (Adult,Obstetrics,Pediatric)   Absence of Maternal Fall making progress toward outcome     Goal: Absence of [place] Fall/Drop  Outcome: Ongoing (interventions implemented as appropriate)   18 1141   Fall Risk  (Adult,Obstetrics,Pediatric)   Absence of [place] Fall/Drop other (see comments)  (infant not born)       Problem: Pressure Ulcer Risk (Ronny Scale) (Adult,Obstetrics,Pediatric)  Goal: Identify Related Risk Factors and Signs and Symptoms  Outcome: Ongoing (interventions implemented as appropriate)   18 1141   Pressure Ulcer Risk (Ronny Scale)   Related Risk Factors (Pressure Ulcer Risk (Ronny Scale)) medication     Goal: Skin Integrity  Outcome: Ongoing (interventions implemented as appropriate)   18 1141   Pressure Ulcer Risk (Ronny Scale) (Adult,Obstetrics,Pediatric)   Skin Integrity making progress toward outcome

## 2018-03-03 NOTE — ANESTHESIA POSTPROCEDURE EVALUATION
"Patient: Margaret Olmstead    Procedure Summary     Date Anesthesia Start Anesthesia Stop Room / Location    03/03/18 0920 1357        Procedure Diagnosis Scheduled Providers Provider    LABOR ANALGESIA No diagnosis on file.  Franck Calhoun MD          Anesthesia Type: epidural  Last vitals  BP   126/74 (03/03/18 1513)   Temp   36.8 °C (98.3 °F) (03/03/18 1414)   Pulse   82 (03/03/18 1513)   Resp   16 (03/03/18 1458)     SpO2   98 % (03/03/18 0902)     Post Anesthesia Care and Evaluation    Patient location during evaluation: bedside  Patient participation: complete - patient participated  Level of consciousness: awake and alert  Pain management: adequate  Airway patency: patent  Anesthetic complications: No anesthetic complications    Cardiovascular status: acceptable  Respiratory status: acceptable  Hydration status: acceptable    Comments: /74  Pulse 82  Temp 36.8 °C (98.3 °F) (Oral)   Resp 16  Ht 167.6 cm (65.98\")  Wt 67.1 kg (148 lb)  LMP 03/08/2017 (Exact Date)  SpO2 98%  Breastfeeding? No  BMI 23.9 kg/m2      "

## 2018-03-04 LAB
BASOPHILS # BLD AUTO: 0.01 10*3/MM3 (ref 0–0.2)
BASOPHILS NFR BLD AUTO: 0.1 % (ref 0–1.5)
DEPRECATED RDW RBC AUTO: 50.8 FL (ref 37–54)
EOSINOPHIL # BLD AUTO: 0.15 10*3/MM3 (ref 0–0.7)
EOSINOPHIL NFR BLD AUTO: 1.6 % (ref 0.3–6.2)
ERYTHROCYTE [DISTWIDTH] IN BLOOD BY AUTOMATED COUNT: 15.2 % (ref 11.7–13)
HCT VFR BLD AUTO: 28.3 % (ref 35.6–45.5)
HGB BLD-MCNC: 9 G/DL (ref 11.9–15.5)
IMM GRANULOCYTES # BLD: 0.04 10*3/MM3 (ref 0–0.03)
IMM GRANULOCYTES NFR BLD: 0.4 % (ref 0–0.5)
LYMPHOCYTES # BLD AUTO: 2 10*3/MM3 (ref 0.9–4.8)
LYMPHOCYTES NFR BLD AUTO: 21.6 % (ref 19.6–45.3)
MCH RBC QN AUTO: 29 PG (ref 26.9–32)
MCHC RBC AUTO-ENTMCNC: 31.8 G/DL (ref 32.4–36.3)
MCV RBC AUTO: 91.3 FL (ref 80.5–98.2)
MONOCYTES # BLD AUTO: 0.82 10*3/MM3 (ref 0.2–1.2)
MONOCYTES NFR BLD AUTO: 8.8 % (ref 5–12)
NEUTROPHILS # BLD AUTO: 6.26 10*3/MM3 (ref 1.9–8.1)
NEUTROPHILS NFR BLD AUTO: 67.5 % (ref 42.7–76)
PLATELET # BLD AUTO: 135 10*3/MM3 (ref 140–500)
PMV BLD AUTO: 12.1 FL (ref 6–12)
RBC # BLD AUTO: 3.1 10*6/MM3 (ref 3.9–5.2)
WBC NRBC COR # BLD: 9.28 10*3/MM3 (ref 4.5–10.7)

## 2018-03-04 PROCEDURE — 90715 TDAP VACCINE 7 YRS/> IM: CPT | Performed by: OBSTETRICS & GYNECOLOGY

## 2018-03-04 PROCEDURE — 90471 IMMUNIZATION ADMIN: CPT | Performed by: OBSTETRICS & GYNECOLOGY

## 2018-03-04 PROCEDURE — 99024 POSTOP FOLLOW-UP VISIT: CPT | Performed by: OBSTETRICS & GYNECOLOGY

## 2018-03-04 PROCEDURE — 85025 COMPLETE CBC W/AUTO DIFF WBC: CPT | Performed by: OBSTETRICS & GYNECOLOGY

## 2018-03-04 PROCEDURE — 25010000002 TDAP 5-2.5-18.5 LF-MCG/0.5 SUSPENSION: Performed by: OBSTETRICS & GYNECOLOGY

## 2018-03-04 RX ADMIN — DOCUSATE SODIUM 100 MG: 100 CAPSULE, LIQUID FILLED ORAL at 21:19

## 2018-03-04 RX ADMIN — Medication 1 TABLET: at 10:50

## 2018-03-04 RX ADMIN — HYDROCODONE BITARTRATE AND ACETAMINOPHEN 1 TABLET: 5; 325 TABLET ORAL at 08:24

## 2018-03-04 RX ADMIN — HYDROCODONE BITARTRATE AND ACETAMINOPHEN 1 TABLET: 5; 325 TABLET ORAL at 19:59

## 2018-03-04 RX ADMIN — DOCUSATE SODIUM 100 MG: 100 CAPSULE, LIQUID FILLED ORAL at 08:24

## 2018-03-04 RX ADMIN — HYDROCODONE BITARTRATE AND ACETAMINOPHEN 1 TABLET: 5; 325 TABLET ORAL at 03:11

## 2018-03-04 RX ADMIN — TETANUS TOXOID, REDUCED DIPHTHERIA TOXOID AND ACELLULAR PERTUSSIS VACCINE, ADSORBED 0.5 ML: 5; 2.5; 8; 8; 2.5 SUSPENSION INTRAMUSCULAR at 23:42

## 2018-03-04 RX ADMIN — IBUPROFEN 600 MG: 600 TABLET ORAL at 19:59

## 2018-03-04 RX ADMIN — IBUPROFEN 600 MG: 600 TABLET ORAL at 10:50

## 2018-03-04 RX ADMIN — HYDROCODONE BITARTRATE AND ACETAMINOPHEN 1 TABLET: 5; 325 TABLET ORAL at 23:58

## 2018-03-04 RX ADMIN — IBUPROFEN 600 MG: 600 TABLET ORAL at 03:11

## 2018-03-04 NOTE — PLAN OF CARE
Problem: Patient Care Overview (Adult)  Goal: Plan of Care Review  Outcome: Ongoing (interventions implemented as appropriate)    Goal: Adult Individualization and Mutuality  Outcome: Ongoing (interventions implemented as appropriate)    Goal: Discharge Needs Assessment  Outcome: Ongoing (interventions implemented as appropriate)      Problem: Fall Risk  (Adult,Obstetrics,Pediatric)  Goal: Identify Related Risk Factors and Signs and Symptoms  Outcome: Ongoing (interventions implemented as appropriate)      Problem: Postpartum, Vaginal Delivery (Adult)  Goal: Signs and Symptoms of Listed Potential Problems Will be Absent or Manageable (Postpartum, Vaginal Delivery)  Outcome: Ongoing (interventions implemented as appropriate)

## 2018-03-04 NOTE — PLAN OF CARE
Problem: Patient Care Overview (Adult)  Goal: Plan of Care Review  Outcome: Ongoing (interventions implemented as appropriate)   03/04/18 0606   Coping/Psychosocial Response Interventions   Plan Of Care Reviewed With patient   Patient Care Overview   Progress progress toward functional goals as expected   Outcome Evaluation   Outcome Summary/Follow up Plan VSS, bleeding wnl, voiding, up ad teri, pain controlled with po meds, bottlefeeding     Goal: Adult Individualization and Mutuality  Outcome: Ongoing (interventions implemented as appropriate)    Goal: Discharge Needs Assessment  Outcome: Ongoing (interventions implemented as appropriate)      Problem: Postpartum, Vaginal Delivery (Adult)  Goal: Signs and Symptoms of Listed Potential Problems Will be Absent or Manageable (Postpartum, Vaginal Delivery)  Outcome: Ongoing (interventions implemented as appropriate)   03/04/18 0606   Postpartum, Vaginal Delivery   Problems Assessed (Postpartum Vaginal Delivery) all   Problems Present (Postpartum Vaginal Delivery) none

## 2018-03-04 NOTE — PROGRESS NOTES
Vital:   Vitals:    03/04/18 0732   BP: 115/75   Pulse: 70   Resp: 16   Temp: 98.2 °F (36.8 °C)   SpO2:    CC:  F/U postpartum visit without complaints    Lochia Scant    Episiotomy: Sutures healing well   Hemoglobin:      Recent Results (from the past 24 hour(s))   Urine Drug Screen - Urine, Clean Catch    Collection Time: 03/03/18  1:05 PM   Result Value Ref Range    Amphet/Methamphet, Screen Negative Negative    Barbiturates Screen, Urine Negative Negative    Benzodiazepine Screen, Urine Negative Negative    Cocaine Screen, Urine Negative Negative    Opiate Screen Negative Negative    THC, Screen, Urine Positive (A) Negative    Methadone Screen, Urine Negative Negative    Oxycodone Screen, Urine Negative Negative   CBC Auto Differential    Collection Time: 03/04/18  5:01 AM   Result Value Ref Range    WBC 9.28 4.50 - 10.70 10*3/mm3    RBC 3.10 (L) 3.90 - 5.20 10*6/mm3    Hemoglobin 9.0 (L) 11.9 - 15.5 g/dL    Hematocrit 28.3 (L) 35.6 - 45.5 %    MCV 91.3 80.5 - 98.2 fL    MCH 29.0 26.9 - 32.0 pg    MCHC 31.8 (L) 32.4 - 36.3 g/dL    RDW 15.2 (H) 11.7 - 13.0 %    RDW-SD 50.8 37.0 - 54.0 fl    MPV 12.1 (H) 6.0 - 12.0 fL    Platelets 135 (L) 140 - 500 10*3/mm3    Neutrophil % 67.5 42.7 - 76.0 %    Lymphocyte % 21.6 19.6 - 45.3 %    Monocyte % 8.8 5.0 - 12.0 %    Eosinophil % 1.6 0.3 - 6.2 %    Basophil % 0.1 0.0 - 1.5 %    Immature Grans % 0.4 0.0 - 0.5 %    Neutrophils, Absolute 6.26 1.90 - 8.10 10*3/mm3    Lymphocytes, Absolute 2.00 0.90 - 4.80 10*3/mm3    Monocytes, Absolute 0.82 0.20 - 1.20 10*3/mm3    Eosinophils, Absolute 0.15 0.00 - 0.70 10*3/mm3    Basophils, Absolute 0.01 0.00 - 0.20 10*3/mm3    Immature Grans, Absolute 0.04 (H) 0.00 - 0.03 10*3/mm3         Blood type: A +    Rubella: Immune   Impression  1.  PPD#1- Vag Delivery    Doing well     2. Mild PP anemia     Plan  1. ? DC tomorrow on PNVs and Fe

## 2018-03-05 VITALS
BODY MASS INDEX: 23.78 KG/M2 | HEIGHT: 66 IN | WEIGHT: 148 LBS | SYSTOLIC BLOOD PRESSURE: 118 MMHG | DIASTOLIC BLOOD PRESSURE: 67 MMHG | RESPIRATION RATE: 18 BRPM | HEART RATE: 80 BPM | TEMPERATURE: 98.6 F | OXYGEN SATURATION: 98 %

## 2018-03-05 PROBLEM — Z34.90 PREGNANCY: Status: RESOLVED | Noted: 2018-03-03 | Resolved: 2018-03-05

## 2018-03-05 PROCEDURE — 99024 POSTOP FOLLOW-UP VISIT: CPT | Performed by: OBSTETRICS & GYNECOLOGY

## 2018-03-05 RX ORDER — IBUPROFEN 600 MG/1
600 TABLET ORAL EVERY 8 HOURS PRN
Qty: 30 TABLET | Refills: 0 | Status: SHIPPED | OUTPATIENT
Start: 2018-03-05 | End: 2018-05-11

## 2018-03-05 RX ORDER — HYDROCODONE BITARTRATE AND ACETAMINOPHEN 5; 325 MG/1; MG/1
1 TABLET ORAL EVERY 6 HOURS PRN
Qty: 20 TABLET | Refills: 0 | Status: SHIPPED | OUTPATIENT
Start: 2018-03-05 | End: 2018-03-12

## 2018-03-05 RX ADMIN — IBUPROFEN 600 MG: 600 TABLET ORAL at 04:09

## 2018-03-05 RX ADMIN — DOCUSATE SODIUM 100 MG: 100 CAPSULE, LIQUID FILLED ORAL at 09:23

## 2018-03-05 RX ADMIN — Medication 1 TABLET: at 09:23

## 2018-03-05 RX ADMIN — HYDROCODONE BITARTRATE AND ACETAMINOPHEN 1 TABLET: 5; 325 TABLET ORAL at 14:05

## 2018-03-05 RX ADMIN — HYDROCODONE BITARTRATE AND ACETAMINOPHEN 1 TABLET: 5; 325 TABLET ORAL at 09:23

## 2018-03-05 RX ADMIN — IBUPROFEN 600 MG: 600 TABLET ORAL at 14:06

## 2018-03-05 RX ADMIN — HYDROCODONE BITARTRATE AND ACETAMINOPHEN 1 TABLET: 5; 325 TABLET ORAL at 04:09

## 2018-03-05 NOTE — PLAN OF CARE
Problem: Patient Care Overview (Adult)  Goal: Plan of Care Review  Outcome: Ongoing (interventions implemented as appropriate)   03/04/18 2311   Coping/Psychosocial Response Interventions   Plan Of Care Reviewed With patient   Patient Care Overview   Progress improving   Outcome Evaluation   Outcome Summary/Follow up Plan Pain controlled, ambulates and voids on own, bottlefeeding infant. Needs to be seen by SS before D/c.     Goal: Adult Individualization and Mutuality  Outcome: Ongoing (interventions implemented as appropriate)   03/04/18 2311   Individualization   Patient Specific Preferences Bottlefeeding, pain control     Goal: Discharge Needs Assessment  Outcome: Ongoing (interventions implemented as appropriate)   03/04/18 2311   Discharge Needs Assessment   Concerns To Be Addressed no discharge needs identified       Problem: Postpartum, Vaginal Delivery (Adult)  Goal: Signs and Symptoms of Listed Potential Problems Will be Absent or Manageable (Postpartum, Vaginal Delivery)  Outcome: Ongoing (interventions implemented as appropriate)   03/04/18 2311   Postpartum, Vaginal Delivery   Problems Assessed (Postpartum Vaginal Delivery) all   Problems Present (Postpartum Vaginal Delivery) pain

## 2018-03-05 NOTE — PLAN OF CARE
Problem: Patient Care Overview (Adult)  Goal: Plan of Care Review  Outcome: Ongoing (interventions implemented as appropriate)   03/05/18 1036   Coping/Psychosocial Response Interventions   Plan Of Care Reviewed With patient   Patient Care Overview   Progress improving   Outcome Evaluation   Outcome Summary/Follow up Plan pain well controlled, ambulating well, bottle feeding baby,  to see before discharge, encouraged to set up WIC appointment     Goal: Adult Individualization and Mutuality  Outcome: Ongoing (interventions implemented as appropriate)    Goal: Discharge Needs Assessment  Outcome: Ongoing (interventions implemented as appropriate)      Problem: Postpartum, Vaginal Delivery (Adult)  Goal: Signs and Symptoms of Listed Potential Problems Will be Absent or Manageable (Postpartum, Vaginal Delivery)  Outcome: Ongoing (interventions implemented as appropriate)   03/05/18 1036   Postpartum, Vaginal Delivery   Problems Assessed (Postpartum Vaginal Delivery) all   Problems Present (Postpartum Vaginal Delivery) none

## 2018-03-05 NOTE — PROGRESS NOTES
CC: Patient is postpartum day number 2.  S: Patient without complaints.  No events overnight.  She is tolerating a regular diet.  She is ambulating and voiding without difficulty.  Lochia is minimal. Plans to bottle feed.    O:   Vitals:    18 0732 18 1514 18 2330 18 0722   BP: 115/75 128/71 130/88 118/67   BP Location: Left arm Right arm Right arm Right arm   Patient Position: Lying Lying Sitting    Pulse: 70 74 82 80   Resp:  16    Temp: 98.2 °F (36.8 °C) 98.4 °F (36.9 °C) 97.9 °F (36.6 °C) 98.6 °F (37 °C)   TempSrc: Oral Oral Oral Oral   SpO2:       Weight:       Height:       General: No acute distress, awake and oriented ×3  Fundus: Firm, nontender, below the umbilicus  Abdomen: Soft, nontender, nondistended  Extremities: No calf tenderness, no Homans sign, no lower extremity edema    Rh+, rubella immune, female infant          Invalid input(s): POTASSIUM, LABRCNTIP, CRCL, LABRCNTIP      CrCl cannot be calculated (No order found.).    Results from last 7 days  Lab Units 18  0501 18  0912   WBC 10*3/mm3 9.28 9.15   HEMOGLOBIN g/dL 9.0* 9.9*   PLATELETS 10*3/mm3 135* 144              Scheduled Meds:  docusate sodium 100 mg Oral BID   prenatal (CLASSIC) vitamin 1 tablet Oral Daily     Continuous Infusions:   PRN Meds:.•  benzocaine  •  benzocaine-lanolin-aloe vera  •  bisacodyl  •  carboprost  •  HYDROcodone-acetaminophen  •  hydrocortisone  •  ibuprofen  •  magnesium hydroxide  •  methylergonovine  •  methylergonovine  •  misoprostol  •  ondansetron  •  pramoxine-hydrocortisone  •  sodium chloride  •  zolpidem    Assessment:  1.  22-year-old  3 para 3 status post spontaneous vaginal delivery, postpartum day #2, doing well  2.  Marginal insertion of the vocal cord, delivered  3.  Anemia of pregnancy, asymptomatic, delivered    Plan:  1.  Patient is stable for discharge home today.  Extensive discharge instructions given to the patient verbalized understanding.  She  will follow up to see me in the office in 6 weeks.  We discussed her postpartum contraception plans, and the patient would like to have a Mirena IUD.  She is instructed to come to our office within the next 2 weeks to sign the order form for the Mirena so that we may order it to insert at her 6 week visit.

## 2018-03-05 NOTE — PROGRESS NOTES
Continued Stay Note  [facility]     Patient Name: Margaret Olmstead  MRN: [id number]  Today's Date: 3/5/2018    Admit Date: 3/3/2018          Discharge Plan       18 1337    Case Management/Social Work Plan    Plan Home with infant; follow for meconium     Patient/Family In Agreement With Plan yes    Additional Comments CCP spoke with CPS/supervisor; Zoë Staley. Per Zoë; baby is able to discharge home with mother and CPS will follow up at home. CCP updated RN/Agata. CCP will follow for meconium. Amanda Ramos CSW       18 1240    Case Management/Social Work Plan    Plan TBD; follow for CPS recommendations    Patient/Family In Agreement With Plan yes    Additional Comments Mother's MRN:  [id number] and Baby's (Francoise Olmstead) MRN: [id number]. Consult reason: mother and baby's urine positive for THC. Per Britt/CPS; no history or active case. CCP made CPS report to Thalia; reference #[id number] and faxed baby's urine results [phone]. Mother consented to CCP talking with significant other; Bobo Martínez present ([phone]). Mother received prenatal care through Dr. Morse.  Baby's father is not involved. Mother has two other children; Rylee and Bobo Martínez (4 years old and 3 years old). Maternal grandmother is currently watching her children while she is at the hospital. Mother lives with her significant other and her two children at [address] [place] [place] [address]. Mother was staying with her grandmother at [address] [place] [address]. [place] [address]. Mother stated she talked with Gaby/Lori and baby was added to her insurance. Mother has pediatrician for her baby; [facility]; where her other children go. Mother is on WIC and has the phone number and locations. Mother plans to bottle feed; mother has car seat, crib and clothes for baby. Mother does not work but her significant other works full time at [employer]. Mother admitted to THC usage but  states the last time she smoked was three months ago. Mother stated she did it because she did not have an appetite. Mother disclosed she smoked twice a day. Mother states she did not smoke regularly. Mother denies any other drug or alcohol use. MATT/Agata updated. CCP will follow for CPS recommendations and follow for meconium. Amanda Ramos CSW               Discharge Codes     None        Expected Discharge Date and Time     Expected Discharge Date Expected Discharge Time    Mar 5, 2018             Snehal Ramos, JOVANIW

## 2018-03-05 NOTE — PROGRESS NOTES
Continued Stay Note  [facility]     Patient Name: Margaret Olmstead  MRN: [id number]  Today's Date: 3/5/2018    Admit Date: 3/3/2018          Discharge Plan       18 1240    Case Management/Social Work Plan    Plan TBD; follow for CPS recommendations    Patient/Family In Agreement With Plan yes    Additional Comments Mother's MRN:  [id number] and Baby's (Francoise Olmstead) MRN: [id number]. Consult reason: mother and baby's urine positive for THC. Per Britt/CPS; no history or active case. CCP made CPS report to Thalia; reference #[id number] and faxed baby's urine results [phone]. Mother consented to CCP talking with significant other; Bobo Cervantesrex present ([phone]). Mother received prenatal care through Dr. Morse.  Baby's father is not involved. Mother has two other children; Rylee and Bobo Martínez (4 years old and 3 years old). Maternal grandmother is currently watching her children while she is at the hospital. Mother lives with her significant other and her two children at [address] [place] [address]. [place] [address]. Mother was staying with her grandmother at [address] [facility] [place] [address]. Mother stated she talked with Gaby/Lori and baby was added to her insurance. Mother has pediatrician for her baby; [facility]; where her other children go. Mother is on WIC and has the phone number and locations. Mother plans to bottle feed; mother has car seat, crib and clothes for baby. Mother does not work but her significant other works full time at [employer]. Mother admitted to THC usage but states the last time she smoked was three months ago. Mother stated she did it because she did not have an appetite. Mother disclosed she smoked twice a day. Mother states she did not smoke regularly. Mother denies any other drug or alcohol use. RN/Agata updated. CCP will follow for CPS recommendations and follow for meconium. Amanda Ramos CSW               Discharge Codes      None            WILL Watters

## 2018-03-09 LAB
CANNABINOIDS UR QL CFM: POSITIVE
Lab: ABNORMAL
THC UR CFM-MCNC: >300 NG/ML

## 2018-03-12 ENCOUNTER — APPOINTMENT (OUTPATIENT)
Dept: LABOR AND DELIVERY | Facility: HOSPITAL | Age: 23
End: 2018-03-12

## 2018-05-01 ENCOUNTER — POSTPARTUM VISIT (OUTPATIENT)
Dept: OBSTETRICS AND GYNECOLOGY | Facility: CLINIC | Age: 23
End: 2018-05-01

## 2018-05-01 VITALS
HEART RATE: 99 BPM | WEIGHT: 132 LBS | BODY MASS INDEX: 21.99 KG/M2 | HEIGHT: 65 IN | DIASTOLIC BLOOD PRESSURE: 79 MMHG | SYSTOLIC BLOOD PRESSURE: 124 MMHG

## 2018-05-01 DIAGNOSIS — Z30.09 BIRTH CONTROL COUNSELING: ICD-10-CM

## 2018-05-01 PROBLEM — O99.820 GBS (GROUP B STREPTOCOCCUS CARRIER), +RV CULTURE, CURRENTLY PREGNANT: Status: RESOLVED | Noted: 2017-07-31 | Resolved: 2018-05-01

## 2018-05-01 PROBLEM — O43.193 MARGINAL INSERTION OF UMBILICAL CORD AFFECTING MANAGEMENT OF MOTHER IN THIRD TRIMESTER: Status: RESOLVED | Noted: 2017-11-09 | Resolved: 2018-05-01

## 2018-05-01 PROBLEM — Z34.80 NORMAL PREGNANCY IN MULTIGRAVIDA: Status: RESOLVED | Noted: 2017-07-27 | Resolved: 2018-05-01

## 2018-05-01 PROCEDURE — 0503F POSTPARTUM CARE VISIT: CPT | Performed by: OBSTETRICS & GYNECOLOGY

## 2018-05-01 RX ORDER — MEDROXYPROGESTERONE ACETATE 150 MG/ML
150 INJECTION, SUSPENSION INTRAMUSCULAR
Qty: 1 ML | Refills: 3 | Status: SHIPPED | OUTPATIENT
Start: 2018-05-01 | End: 2019-05-15

## 2018-05-01 NOTE — PROGRESS NOTES
"Akin Olmstead is a 22 y.o. female.   CC: Pt here for 6wk pp.  History of Present Illness   Pt here for 6wk pp. she is here 6 weeks following an uncomplicated vaginal delivery.  She reports that she had a period about 3 months ago.  She is not breast-feeding.  She denies any problems with bowel or bladder function.  No signs or symptoms of postpartum depression.  She would like Depo-Provera for birth control.  She has been sexually active one time since delivery and did not use a condom.  Last Pap smear September 2017 and was normal.    The following portions of the patient's history were reviewed and updated as appropriate: allergies, current medications, past family history, past medical history, past social history, past surgical history and problem list.    Review of Systems  General: No fever or chills  Constitutional: No weight loss or gain, no hair loss  HENT: No headache, no hearing loss, no tinnitus  Eyes: normal vision, no eye pain  Lungs: No cough, no shortness of breath  Heart: No chest pain, no palpitations  Abdomen: No nausea, vomiting, constipation or diarrhea  : No dysuria, no hematuria  Skin: No rashes  Lymph: No swelling  Neuro: No parathesia, no weakness  Psych: Normal though content, no hallucinations, no SI/HI    Objective   Physical Exam  Vitals:    05/01/18 1048   BP: 124/79   Pulse: 99   Weight: 59.9 kg (132 lb)   Height: 165.1 cm (65\")   Gen: No acute distress, awake and oriented times three  Abdomen: soft, nontender, non distended, normoactive bowel sounds  Pelvic: Exam performed in the presence of a female chaperone  Patient has provided verbal consent to proceed with exam.  Normal external female genitalia, no lesions  Vagina: No blood or discharge  Cervix: No cervical motion tenderness, no lesions, no active bleeding, nonfriable  Uterus: Anteverted, normal size and shape, nontender  Adnexa: No masses or tenderness  Rectal: Deferred  Psych: Good judgement and insight, " normal affect and mood      Assessment/Plan   Diagnoses and all orders for this visit:    Postpartum exam    Normal delivery at term    Birth control counseling  -     medroxyPROGESTERone (DEPO-PROVERA) 150 MG/ML injection; Inject 1 mL into the shoulder, thigh, or buttocks Every 3 (Three) Months.      Patient may resume normal activities.  We discussed contraceptive options at length, and the patient like to start on Depo-Provera injections.  I would recommend that she wait to her next period to start this.  She she is abstinence or exclusive use of condoms for birth control until then.  She verbalizes understanding.  I would otherwise plan to see her back in about 1 year for annual exam, or sooner as needed

## 2018-05-11 ENCOUNTER — CLINICAL SUPPORT (OUTPATIENT)
Dept: OBSTETRICS AND GYNECOLOGY | Facility: CLINIC | Age: 23
End: 2018-05-11

## 2018-05-11 VITALS
HEART RATE: 89 BPM | HEIGHT: 65 IN | BODY MASS INDEX: 21.83 KG/M2 | SYSTOLIC BLOOD PRESSURE: 112 MMHG | WEIGHT: 131 LBS | DIASTOLIC BLOOD PRESSURE: 73 MMHG

## 2018-05-11 DIAGNOSIS — Z30.42 DEPO-PROVERA CONTRACEPTIVE STATUS: Primary | ICD-10-CM

## 2018-05-11 LAB
B-HCG UR QL: NEGATIVE
INTERNAL NEGATIVE CONTROL: NEGATIVE
INTERNAL POSITIVE CONTROL: POSITIVE
Lab: NORMAL

## 2018-05-11 PROCEDURE — 81025 URINE PREGNANCY TEST: CPT | Performed by: OBSTETRICS & GYNECOLOGY

## 2018-05-11 PROCEDURE — 96372 THER/PROPH/DIAG INJ SC/IM: CPT | Performed by: OBSTETRICS & GYNECOLOGY

## 2018-05-11 RX ORDER — MEDROXYPROGESTERONE ACETATE 150 MG/ML
150 INJECTION, SUSPENSION INTRAMUSCULAR ONCE
Status: COMPLETED | OUTPATIENT
Start: 2018-05-11 | End: 2018-05-11

## 2018-05-11 RX ADMIN — MEDROXYPROGESTERONE ACETATE 150 MG: 150 INJECTION, SUSPENSION INTRAMUSCULAR at 08:58

## 2018-05-11 NOTE — PROGRESS NOTES
Subjective   Margaret Olmstead is a 22 y.o. female. Here for depo provera inj. Lot # [id number] exp 5/31/2021 ndc [address]-0. Pt did not have a reaction.    History of Present Illness        Review of Systems    Objective   Physical Exam    Assessment/Plan   There are no diagnoses linked to this encounter.

## 2018-06-19 ENCOUNTER — TELEPHONE (OUTPATIENT)
Dept: OBSTETRICS AND GYNECOLOGY | Facility: CLINIC | Age: 23
End: 2018-06-19

## 2018-06-19 NOTE — TELEPHONE ENCOUNTER
----- Message from Cynthia Reyer sent at 6/19/2018 12:15 PM EDT -----  Pt is having bleeding on depo for the past 2 weeks. She thought she might be due for an injection but it looks like her next injection is scheduled for August. Previously when she was on depo she would only bleed about 3 days. Please advise. # [phone]

## 2018-06-19 NOTE — TELEPHONE ENCOUNTER
Patient just had her first Depo-Provera injection in May of this year.  It is completely normal to have this type of breakthrough bleeding within the first 3 months after receiving her Depo-Provera injection.  I do not think she needs to do anything further.  By the time she is due for her second injection, the bleeding will likely be resolving by either stopping altogether or going back to light spotting around the time of a period.

## 2018-08-03 ENCOUNTER — CLINICAL SUPPORT (OUTPATIENT)
Dept: OBSTETRICS AND GYNECOLOGY | Facility: CLINIC | Age: 23
End: 2018-08-03

## 2018-08-03 VITALS
DIASTOLIC BLOOD PRESSURE: 80 MMHG | HEART RATE: 92 BPM | SYSTOLIC BLOOD PRESSURE: 139 MMHG | HEIGHT: 65 IN | BODY MASS INDEX: 22.33 KG/M2 | WEIGHT: 134 LBS

## 2018-08-03 DIAGNOSIS — Z30.013 ENCOUNTER FOR INITIAL PRESCRIPTION OF INJECTABLE CONTRACEPTIVE: Primary | ICD-10-CM

## 2018-08-03 PROCEDURE — 96372 THER/PROPH/DIAG INJ SC/IM: CPT | Performed by: OBSTETRICS & GYNECOLOGY

## 2018-08-03 RX ORDER — MEDROXYPROGESTERONE ACETATE 150 MG/ML
150 INJECTION, SUSPENSION INTRAMUSCULAR ONCE
Status: COMPLETED | OUTPATIENT
Start: 2018-08-03 | End: 2018-08-03

## 2018-08-03 RX ADMIN — MEDROXYPROGESTERONE ACETATE 150 MG: 150 INJECTION, SUSPENSION INTRAMUSCULAR at 09:16

## 2018-08-03 NOTE — PROGRESS NOTES
Pt received depo Lot# [id number] Exp: 5/31/2022 NDC# [address]-5. Pt brought. Lt deltoid. No reaction. JOSELITO

## 2018-10-26 ENCOUNTER — CLINICAL SUPPORT (OUTPATIENT)
Dept: OBSTETRICS AND GYNECOLOGY | Facility: CLINIC | Age: 23
End: 2018-10-26

## 2018-10-26 VITALS
HEART RATE: 93 BPM | HEIGHT: 65 IN | WEIGHT: 127 LBS | DIASTOLIC BLOOD PRESSURE: 81 MMHG | BODY MASS INDEX: 21.16 KG/M2 | SYSTOLIC BLOOD PRESSURE: 115 MMHG

## 2018-10-26 DIAGNOSIS — Z30.013 ENCOUNTER FOR INITIAL PRESCRIPTION OF INJECTABLE CONTRACEPTIVE: Primary | ICD-10-CM

## 2018-10-26 PROCEDURE — 96372 THER/PROPH/DIAG INJ SC/IM: CPT | Performed by: OBSTETRICS & GYNECOLOGY

## 2018-10-26 RX ORDER — MEDROXYPROGESTERONE ACETATE 150 MG/ML
150 INJECTION, SUSPENSION INTRAMUSCULAR ONCE
Status: COMPLETED | OUTPATIENT
Start: 2018-10-26 | End: 2018-10-26

## 2018-10-26 RX ADMIN — MEDROXYPROGESTERONE ACETATE 150 MG: 150 INJECTION, SUSPENSION INTRAMUSCULAR at 11:42

## 2019-01-18 ENCOUNTER — CLINICAL SUPPORT (OUTPATIENT)
Dept: OBSTETRICS AND GYNECOLOGY | Facility: CLINIC | Age: 24
End: 2019-01-18

## 2019-01-18 VITALS
SYSTOLIC BLOOD PRESSURE: 127 MMHG | BODY MASS INDEX: 20.99 KG/M2 | HEIGHT: 65 IN | DIASTOLIC BLOOD PRESSURE: 87 MMHG | WEIGHT: 126 LBS | HEART RATE: 93 BPM

## 2019-01-18 DIAGNOSIS — Z30.013 ENCOUNTER FOR INITIAL PRESCRIPTION OF INJECTABLE CONTRACEPTIVE: Primary | ICD-10-CM

## 2019-01-18 PROCEDURE — 96372 THER/PROPH/DIAG INJ SC/IM: CPT | Performed by: OBSTETRICS & GYNECOLOGY

## 2019-01-18 RX ORDER — MEDROXYPROGESTERONE ACETATE 150 MG/ML
150 INJECTION, SUSPENSION INTRAMUSCULAR ONCE
Status: COMPLETED | OUTPATIENT
Start: 2019-01-18 | End: 2019-01-18

## 2019-01-18 RX ADMIN — MEDROXYPROGESTERONE ACETATE 150 MG: 150 INJECTION, SUSPENSION INTRAMUSCULAR at 13:01

## 2019-04-16 ENCOUNTER — TELEPHONE (OUTPATIENT)
Dept: OBSTETRICS AND GYNECOLOGY | Facility: CLINIC | Age: 24
End: 2019-04-16

## 2019-04-16 NOTE — TELEPHONE ENCOUNTER
Pt said she went to the [facility] office today for her depo injection and realized she forgot to bring her depo with her. She is frustrated with having to keep coming in every 12 weeks for injections and would like to switch from depo to Nexplanon if this is okay with you. She said she has consulted with you in the past about it but ended up deciding to use depo instead. Can she schedule for an insertion while she is on her period now? Or would you rather speak with her in office about this first? Please advise. # [phone]

## 2019-04-16 NOTE — TELEPHONE ENCOUNTER
Since the patient has passport insurance, she will need to sign the form so that we can submit her benefits.  This will need to be done before it can be inserted.  And, yes, since she is past due for her Depo-Provera injection, she will need to be on her period or within 7 days of the start of her period

## 2019-05-15 ENCOUNTER — PROCEDURE VISIT (OUTPATIENT)
Dept: OBSTETRICS AND GYNECOLOGY | Facility: CLINIC | Age: 24
End: 2019-05-15

## 2019-05-15 VITALS
BODY MASS INDEX: 20.66 KG/M2 | HEIGHT: 65 IN | DIASTOLIC BLOOD PRESSURE: 83 MMHG | SYSTOLIC BLOOD PRESSURE: 128 MMHG | WEIGHT: 124 LBS | HEART RATE: 82 BPM

## 2019-05-15 DIAGNOSIS — Z30.017 NEXPLANON INSERTION: ICD-10-CM

## 2019-05-15 DIAGNOSIS — Z32.02 NEGATIVE PREGNANCY TEST: Primary | ICD-10-CM

## 2019-05-15 PROCEDURE — 81025 URINE PREGNANCY TEST: CPT | Performed by: OBSTETRICS & GYNECOLOGY

## 2019-05-15 PROCEDURE — 11981 INSERTION DRUG DLVR IMPLANT: CPT | Performed by: OBSTETRICS & GYNECOLOGY

## 2019-05-15 NOTE — PROGRESS NOTES
Procedure   Procedures   Cc: pt here for Nexplanon insertion.      Procedure: Nexplanon insertion  Preoperative diagnosis: 23-year-old  3 para 3 for Nexplanon insertion  Postoperative diagnosis: Same  Indications: Patient is here for Nexplanon insertion.  She had a baby 1 year ago.  She had been using Depo-Provera but had a lot of difficulty with getting back into the office to do her injections every 12 weeks.  She has decided that she would like to have the Nexplanon.  We have discussed the risk, benefits, alternatives, and side effects at length, including irregular bleeding profile.  All of her questions are answered and she wishes to proceed.  Anesthesia: 1% lidocaine with epinephrine  Pathology: None  Estimated blood loss: Less than 5 mL  Complications: None    Procedure in detail:    Risks, benefits, alternatives explained. All questions answered. Consents signed.  Patient placed on examined table. Nexplanon to be inserted into nondominant left arm. The area for insertion prepped with betadine in a sterile fashion. About 5 mL of 1% lidocaine with epinephrine.   The insertion site was identified approximately 6-8 cm proximal to the elbow crease in the underside of the upper arm overlying the groove between the biceps and triceps muscles. The skin at the insertion site was stretched by my thumb and index finger. Then inserted the needle tip, bevel side up, at an angle not greater than 20° to the skin surface, just until the skin was penetrated. The applicator was then lowered so that it was parallel to the arm. To minimize the chance of deep incision, the skin was tented upwards with the tip of the needle. The needle was then gently inserted to the full length. Then fixed the device in place on the arm with one hand. I then slowly and carefully retracted the needle cannula back along the length of the device after pushing the release button. The obturator was seen in the device to determine that the  nexplanon had been released. Both the patient and I were easily able to feel the device under the skin. Steri-Strips were applied at the site, and the arm was gently wrapped with gauze. There were no complications. The patient tolerated the procedure well. She was given a reminder card. She was advised to use condoms as a backup method for at least a week after insertion.

## 2020-12-16 ENCOUNTER — TELEPHONE (OUTPATIENT)
Dept: OBSTETRICS AND GYNECOLOGY | Facility: CLINIC | Age: 25
End: 2020-12-16

## 2021-03-12 ENCOUNTER — TELEPHONE (OUTPATIENT)
Dept: OBSTETRICS AND GYNECOLOGY | Facility: CLINIC | Age: 26
End: 2021-03-12

## 2022-01-26 ENCOUNTER — TELEPHONE (OUTPATIENT)
Dept: OBSTETRICS AND GYNECOLOGY | Facility: CLINIC | Age: 27
End: 2022-01-26

## 2022-11-18 ENCOUNTER — TELEPHONE (OUTPATIENT)
Dept: OBSTETRICS AND GYNECOLOGY | Facility: CLINIC | Age: 27
End: 2022-11-18

## 2023-04-24 ENCOUNTER — OFFICE VISIT (OUTPATIENT)
Dept: OBSTETRICS AND GYNECOLOGY | Facility: CLINIC | Age: 28
End: 2023-04-24
Payer: COMMERCIAL

## 2023-04-24 VITALS
DIASTOLIC BLOOD PRESSURE: 85 MMHG | BODY MASS INDEX: 22.49 KG/M2 | HEART RATE: 96 BPM | SYSTOLIC BLOOD PRESSURE: 129 MMHG | HEIGHT: 65 IN | WEIGHT: 135 LBS

## 2023-04-24 DIAGNOSIS — Z30.46 NEXPLANON REMOVAL: ICD-10-CM

## 2023-04-24 DIAGNOSIS — Z01.419 ENCOUNTER FOR GYNECOLOGICAL EXAMINATION WITHOUT ABNORMAL FINDING: Primary | ICD-10-CM

## 2023-04-24 NOTE — PROGRESS NOTES
"GYN Annual Exam     CC- Here for annual exam.     Margaret Olmstead is a 27 y.o. female who presents for annual well woman exam. Periods are irregular due to Nexplanon.     OB History        3    Para   3    Term   3            AB        Living   3       SAB        IAB        Ectopic        Molar        Multiple   0    Live Births   3                Current contraception: Nexplanon-  Inserted 05/15/2019  History of abnormal Pap smear: yes - no treatment required  Family history of uterine, colon or ovarian cancer: no  History of abnormal mammogram: no  Family history of breast cancer: no  Last Pap : 2017 NIL HPV neg      Past Medical History:   Diagnosis Date   • Abnormal Pap smear of cervix    • Hypertension    • Urogenital trichomoniasis            Past Surgical History:   Procedure Laterality Date   • WISDOM TOOTH EXTRACTION         No current outpatient medications on file.    No Known Allergies    Social History     Tobacco Use   • Smoking status: Former     Packs/day: 0.30     Types: Cigarettes   • Smokeless tobacco: Never   • Tobacco comments:     stopped 2017   Vaping Use   • Vaping Use: Some days   Substance Use Topics   • Alcohol use: No   • Drug use: No       Family History   Problem Relation Age of Onset   • Alcohol abuse Mother    • Breast cancer Neg Hx    • Ovarian cancer Neg Hx    • Uterine cancer Neg Hx    • Colon cancer Neg Hx    • Deep vein thrombosis Neg Hx    • Pulmonary embolism Neg Hx        Review of Systems   Constitutional: Negative for chills and fever.   Gastrointestinal: Negative for abdominal pain, constipation and diarrhea.   Genitourinary: Negative for pelvic pain, vaginal bleeding and vaginal discharge.   All other systems reviewed and are negative.      /85   Pulse 96   Ht 165.1 cm (65\")   Wt 61.2 kg (135 lb)   BMI 22.47 kg/m²     Physical Exam  Constitutional:       General: She is not in acute distress.     Appearance: She is well-developed and " normal weight.   Genitourinary:      Vulva normal.      Right Labia: No lesions or Bartholin's cyst.     Left Labia: No lesions or Bartholin's cyst.     No inguinal adenopathy present in the right or left side.     No vaginal discharge or bleeding.        Right Adnexa: not tender, not full and no mass present.     Left Adnexa: not tender, not full and no mass present.     No cervical motion tenderness or friability.      Uterus is enlarged (8-9 wks ).      Uterus is not tender.      No uterine mass detected.     Uterus is anteverted.   Breasts:     Right: No inverted nipple, mass or nipple discharge.      Left: No inverted nipple, mass or nipple discharge.   HENT:      Head: Normocephalic and atraumatic.      Nose: Nose normal.   Eyes:      Conjunctiva/sclera: Conjunctivae normal.      Pupils: Pupils are equal, round, and reactive to light.   Neck:      Thyroid: No thyromegaly.   Cardiovascular:      Rate and Rhythm: Normal rate and regular rhythm.      Heart sounds: Normal heart sounds. No murmur heard.  Pulmonary:      Effort: Pulmonary effort is normal. No respiratory distress.      Breath sounds: Normal breath sounds.   Abdominal:      General: Abdomen is flat. There is no distension.      Palpations: Abdomen is soft.      Tenderness: There is no abdominal tenderness.   Musculoskeletal:         General: No deformity. Normal range of motion.      Cervical back: Normal range of motion and neck supple.      Right lower leg: No edema.      Left lower leg: No edema.   Lymphadenopathy:      Lower Body: No right inguinal adenopathy. No left inguinal adenopathy.   Neurological:      Mental Status: She is alert and oriented to person, place, and time.   Skin:     General: Skin is warm and dry.      Findings: No erythema.   Psychiatric:         Behavior: Behavior normal.         Thought Content: Thought content normal.         Judgment: Judgment normal.   Vitals reviewed. Exam conducted with a chaperone present.           Procedure: Nexplanon removal    Pre Dx: 1) Nexplanon removal  Post Dx: 1) Nexplanon removal     The risks, benefits, and alternatives to remove the device have been discussed with the patient at length. All of her questions are answered. She is aware of the need for alternative means of contraception if desired. Verbal informed consent is obtained.    Able to easily palpate the device in the nondominant left arm. Additional imaging was not required. The device feels freely mobile and easily accessible. She was placed in the examining table in a supine position with her arm flexed at the elbow and hand under her head. The skin over this site is prepped with Betadine. 2-3 mL of 1% lidocaine with epinephrine was injected.    Downward pressure was applied on the proximal end of the implant nearest the axilla, and a 2-3 mm incision was made in a longitudinal direction of the arm at the tip of the implant closest to the elbow. The implant was then pushed gently toward the incision until the tip was visible. The fibrous capsule was opened with blunt dissection using a hemostat. The implant was grasp with a hemostat and was easily removed intact. It measured a  full 4 cm in length.    Tolerated well  No apparent complications    The skin was cleansed and dried. A Steri-Strip was applied. The arm was gently wrapped with Kerlex gauze. The patient had normal strength and sensation in her left extremity. There was no significant bleeding. There were no complications. The patient was advised about proper care of the dressings. She was advised to call or return for complications such as fever, signs of infection, increased pain, or any other concerns.    Warning signs, limitations and expectations reviewed.      Assessment     Diagnoses and all orders for this visit:    1. Encounter for gynecological examination without abnormal finding (Primary)  -     IGP, Rfx Aptima HPV ASCU    2. Nexplanon removal    1) GYN exam    Expectations reviewed.     2) Nexplanon removed  Overdue   Declined contraception   Discussed      Plan     1) Breast Health - Clinical breast exam yearly, Discussed American cancer society recommendations for breast cancer screening, and Self breast awareness monthly  Normal Exam for CBE   2) Pap - updated today   3) Smoking status- cessation encouraged   4) Encouraged between 7-8 hours of good sleep per night.   5) Follow up prn and one year.       Adonay Machuca MD   4/24/2023  14:10 EDT

## 2023-05-01 LAB
CONV .: NORMAL
CYTOLOGIST CVX/VAG CYTO: NORMAL
CYTOLOGY CVX/VAG DOC CYTO: NORMAL
CYTOLOGY CVX/VAG DOC THIN PREP: NORMAL
DX ICD CODE: NORMAL
HIV 1 & 2 AB SER-IMP: NORMAL
OTHER STN SPEC: NORMAL
STAT OF ADQ CVX/VAG CYTO-IMP: NORMAL

## 2024-12-11 ENCOUNTER — OFFICE VISIT (OUTPATIENT)
Dept: OBSTETRICS AND GYNECOLOGY | Facility: CLINIC | Age: 29
End: 2024-12-11
Payer: COMMERCIAL

## 2024-12-11 VITALS
HEIGHT: 65 IN | HEART RATE: 116 BPM | SYSTOLIC BLOOD PRESSURE: 141 MMHG | WEIGHT: 133 LBS | DIASTOLIC BLOOD PRESSURE: 99 MMHG | BODY MASS INDEX: 22.16 KG/M2

## 2024-12-11 DIAGNOSIS — Z01.419 ENCOUNTER FOR GYNECOLOGICAL EXAMINATION WITHOUT ABNORMAL FINDING: Primary | ICD-10-CM

## 2024-12-11 DIAGNOSIS — Z30.017 ENCOUNTER FOR INITIAL PRESCRIPTION OF IMPLANTABLE SUBDERMAL CONTRACEPTIVE: ICD-10-CM

## 2024-12-11 NOTE — PROGRESS NOTES
"GYN Annual Exam     CC- Here for annual exam.     Margaret Olmstead is a 29 y.o. female who presents for annual well woman exam. Periods are regular every 28-30 days, lasting 4 days. Dysmenorrhea:severe if heavier flow.   Pt is interested in birth control- Nexplanon.     OB History          3    Para   3    Term   3            AB        Living   3         SAB        IAB        Ectopic        Molar        Multiple   0    Live Births   3                Current contraception: none  History of abnormal Pap smear: yes - no treatment required   Family history of uterine, colon or ovarian cancer: no  History of abnormal mammogram: no  Family history of breast cancer: no  Pap : 2023 NIL       Past Medical History:   Diagnosis Date    Abnormal Pap smear of cervix     Hypertension     Urogenital trichomoniasis            Past Surgical History:   Procedure Laterality Date    WISDOM TOOTH EXTRACTION         No current outpatient medications on file.    No Known Allergies    Social History     Tobacco Use    Smoking status: Former     Current packs/day: 0.30     Types: Cigarettes    Smokeless tobacco: Never    Tobacco comments:     stopped 2017   Vaping Use    Vaping status: Some Days   Substance Use Topics    Alcohol use: No    Drug use: No       Family History   Problem Relation Age of Onset    Alcohol abuse Mother     Breast cancer Neg Hx     Ovarian cancer Neg Hx     Uterine cancer Neg Hx     Colon cancer Neg Hx     Deep vein thrombosis Neg Hx     Pulmonary embolism Neg Hx        Review of Systems   Constitutional:  Negative for chills and fever.   Gastrointestinal:  Negative for abdominal pain, constipation and diarrhea.   Genitourinary:  Negative for menstrual problem, pelvic pain, vaginal bleeding and vaginal discharge.   All other systems reviewed and are negative.      /99   Pulse 116   Ht 165.1 cm (65\")   Wt 60.3 kg (133 lb)   LMP 2024 (Exact Date)   BMI 22.13 kg/m² "     Physical Exam  Constitutional:       General: She is not in acute distress.     Appearance: She is well-developed and normal weight.   Genitourinary:      Vulva normal.      Right Labia: No lesions or Bartholin's cyst.     Left Labia: No lesions or Bartholin's cyst.     No inguinal adenopathy present in the right or left side.     No vaginal discharge or bleeding.        Right Adnexa: not tender, not full and no mass present.     Left Adnexa: not tender, not full and no mass present.     No cervical motion tenderness or friability.      Uterus is not enlarged or tender.      No uterine mass detected.     Uterus is anteverted.   Breasts:     Right: No inverted nipple, mass or nipple discharge.      Left: No inverted nipple, mass or nipple discharge.   HENT:      Head: Normocephalic and atraumatic.      Nose: Nose normal.   Eyes:      Conjunctiva/sclera: Conjunctivae normal.      Pupils: Pupils are equal, round, and reactive to light.   Neck:      Thyroid: No thyromegaly.   Cardiovascular:      Rate and Rhythm: Normal rate and regular rhythm.      Heart sounds: Normal heart sounds. No murmur heard.  Pulmonary:      Effort: Pulmonary effort is normal. No respiratory distress.      Breath sounds: Normal breath sounds.   Abdominal:      General: Abdomen is flat. There is no distension.      Palpations: Abdomen is soft.      Tenderness: There is no abdominal tenderness.   Musculoskeletal:         General: No deformity. Normal range of motion.      Cervical back: Normal range of motion and neck supple.      Right lower leg: No edema.      Left lower leg: No edema.   Lymphadenopathy:      Lower Body: No right inguinal adenopathy. No left inguinal adenopathy.   Neurological:      Mental Status: She is alert and oriented to person, place, and time.   Skin:     General: Skin is warm and dry.      Findings: No erythema.   Psychiatric:         Behavior: Behavior normal.         Thought Content: Thought content normal.          Judgment: Judgment normal.   Vitals reviewed. Exam conducted with a chaperone present.               Assessment     Diagnoses and all orders for this visit:    1. Encounter for gynecological examination without abnormal finding (Primary)    2. Encounter for initial prescription of implantable subdermal contraceptive    1) GYN exam   Follow up HTN   Otherwise as below     2) Contraception   Desires nexplanon   Aware timing and procedure to place      Plan     1) Breast Health - Clinical breast exam yearly, Discussed American cancer society recommendations for breast cancer screening, and Self breast awareness monthly  CBE normal exam, updated  2) Pap - up to date   3) Smoking status- non-smoker   4) Encouraged between 7-8 hours of good sleep per night.   5) Follow up prn and one year.       Adonay Machuca MD   12/11/2024  15:51 EST

## 2025-01-17 ENCOUNTER — PROCEDURE VISIT (OUTPATIENT)
Dept: OBSTETRICS AND GYNECOLOGY | Facility: CLINIC | Age: 30
End: 2025-01-17
Payer: COMMERCIAL

## 2025-01-17 VITALS
DIASTOLIC BLOOD PRESSURE: 90 MMHG | HEIGHT: 65 IN | SYSTOLIC BLOOD PRESSURE: 126 MMHG | HEART RATE: 112 BPM | WEIGHT: 128 LBS | BODY MASS INDEX: 21.33 KG/M2

## 2025-01-17 DIAGNOSIS — Z30.017 NEXPLANON INSERTION: Primary | ICD-10-CM

## 2025-01-17 LAB
B-HCG UR QL: NEGATIVE
EXPIRATION DATE: NORMAL
INTERNAL NEGATIVE CONTROL: NEGATIVE
INTERNAL POSITIVE CONTROL: POSITIVE
Lab: NORMAL

## 2025-01-17 NOTE — PROGRESS NOTES
Nexplanon Insertion:    Pre Dx: 1) Nexplanon Insertion   Post Dx: 1) Nexplanon Insertion         Lab 01/17/25  1021   HCG URINE Negative         Risks, benefits, alternatives explained. All questions answered. Consents signed.  Time out performed. Lot# [id number], exp 10/2026. NDC# [id number].     Patient placed on examined table in supine position with her nondominant left arm flexed at the elbow and hand resting under her head. Nexplanon to be inserted into nondominant arm. The area for insertion prepped with betadine in a sterile fashion. About 3 mL of 1% lidocaine.     The insertion site was identified approximately 8-10 cm proximal to the humoral epicondyle and 3-4 cm below with sulcus of the triceps and biceps muscle. The skin at the insertion site was stretched by my thumb and index finger. Then inserted the needle tip, bevel side up, at an angle not greater than 30° to the skin surface, just until the skin was penetrated to below the bevel. The applicator was then lowered so that it was parallel to the arm. To minimize the chance of deep incision, the skin was tented upwards with the tip of the needle. The needle was then gently inserted to the full length. Then fixed the device in place on the arm with one hand. I then slowly and carefully retracted the needle back by retracting the release lever. The obturator was seen in the device to determine that the nexplanon had been released.     Both the patient and I were easily able to feel the device under the skin. Steri-Strips were applied at the site, and the arm was gently wrapped with gauze.    There were no complications.   The patient tolerated the procedure well.    She was given a reminder card. She was advised to use condoms as a backup method for at least a week after insertion.    Expectations, warning signs and limitations reviewed.     Adonay Machuca MD  1/17/2025  10:34 EST     
Show Aperture Variable?: Yes
Post-Care Instructions: I reviewed with the patient in detail post-care instructions. Patient is to wear sunprotection, and avoid picking at any of the treated lesions. Pt may apply Aquaphor to crusted or scabbing areas.
Detail Level: Simple
Render Note In Bullet Format When Appropriate: No
Consent: The patient's consent was obtained including but not limited to risks of crusting, scabbing, blistering, scarring, darker or lighter pigmentary change, recurrence, incomplete removal and infection.
Duration Of Freeze Thaw-Cycle (Seconds): 0